# Patient Record
Sex: FEMALE | URBAN - METROPOLITAN AREA
[De-identification: names, ages, dates, MRNs, and addresses within clinical notes are randomized per-mention and may not be internally consistent; named-entity substitution may affect disease eponyms.]

---

## 2019-04-26 ENCOUNTER — APPOINTMENT (RX ONLY)
Dept: URBAN - METROPOLITAN AREA CLINIC 319 | Facility: CLINIC | Age: 79
Setting detail: DERMATOLOGY
End: 2019-04-26

## 2019-04-26 DIAGNOSIS — A63.0 ANOGENITAL (VENEREAL) WARTS: ICD-10-CM

## 2019-04-26 DIAGNOSIS — L91.0 HYPERTROPHIC SCAR: ICD-10-CM

## 2019-04-26 PROCEDURE — ? COUNSELING

## 2019-04-26 PROCEDURE — 99202 OFFICE O/P NEW SF 15 MIN: CPT

## 2019-04-26 PROCEDURE — ? ADDITIONAL NOTES

## 2019-04-26 ASSESSMENT — SCAR ASSESSEMENT OVERALL: SCAR ASSESSMENT: 2 (RAISED UNIFORM SCAR, NO ERYTHEMA)

## 2019-04-26 NOTE — PROCEDURE: ADDITIONAL NOTES
Additional Notes: Patient declines ILK of keloid.
Detail Level: Simple
Additional Notes: No lesions noted on labia.

## 2020-05-06 ENCOUNTER — OFFICE VISIT (OUTPATIENT)
Dept: INTERNAL MEDICINE CLINIC | Facility: CLINIC | Age: 80
End: 2020-05-06
Payer: COMMERCIAL

## 2020-05-06 ENCOUNTER — TELEPHONE (OUTPATIENT)
Dept: INTERNAL MEDICINE CLINIC | Facility: CLINIC | Age: 80
End: 2020-05-06

## 2020-05-06 VITALS
DIASTOLIC BLOOD PRESSURE: 70 MMHG | SYSTOLIC BLOOD PRESSURE: 110 MMHG | HEIGHT: 63 IN | BODY MASS INDEX: 24.27 KG/M2 | WEIGHT: 137 LBS | TEMPERATURE: 98 F | HEART RATE: 54 BPM | OXYGEN SATURATION: 98 %

## 2020-05-06 DIAGNOSIS — Z12.31 ENCOUNTER FOR SCREENING MAMMOGRAM FOR MALIGNANT NEOPLASM OF BREAST: Primary | ICD-10-CM

## 2020-05-06 DIAGNOSIS — R92.2 DENSE BREAST: ICD-10-CM

## 2020-05-06 DIAGNOSIS — I10 ESSENTIAL HYPERTENSION: ICD-10-CM

## 2020-05-06 DIAGNOSIS — R53.83 FATIGUE, UNSPECIFIED TYPE: ICD-10-CM

## 2020-05-06 DIAGNOSIS — I89.0 LYMPHEDEMA: ICD-10-CM

## 2020-05-06 DIAGNOSIS — Z78.0 POSTMENOPAUSAL: ICD-10-CM

## 2020-05-06 PROCEDURE — G0463 HOSPITAL OUTPT CLINIC VISIT: HCPCS | Performed by: INTERNAL MEDICINE

## 2020-05-06 PROCEDURE — 99214 OFFICE O/P EST MOD 30 MIN: CPT | Performed by: INTERNAL MEDICINE

## 2020-05-06 RX ORDER — MULTIVITAMIN WITH IRON
250 TABLET ORAL DAILY
COMMUNITY

## 2020-05-06 RX ORDER — AMLODIPINE BESYLATE 5 MG/1
5 TABLET ORAL DAILY
Qty: 90 TABLET | Refills: 3 | Status: SHIPPED | OUTPATIENT
Start: 2020-05-06 | End: 2021-02-16

## 2020-05-06 RX ORDER — MELATONIN
325 DAILY
Qty: 90 TABLET | Refills: 3 | Status: SHIPPED | OUTPATIENT
Start: 2020-05-06 | End: 2021-02-16

## 2020-05-06 RX ORDER — MELATONIN
325 DAILY
COMMUNITY
End: 2020-05-06

## 2020-05-06 RX ORDER — AMLODIPINE BESYLATE 5 MG/1
5 TABLET ORAL DAILY
COMMUNITY
Start: 2017-04-03 | End: 2020-05-06

## 2020-05-06 RX ORDER — AMPICILLIN TRIHYDRATE 250 MG
600 CAPSULE ORAL 2 TIMES DAILY
COMMUNITY
End: 2020-07-21

## 2020-05-06 RX ORDER — UBIDECARENONE 200 MG
200 CAPSULE ORAL 2 TIMES DAILY
COMMUNITY
End: 2020-07-21

## 2020-05-06 NOTE — PROGRESS NOTES
Lyudmila Juarez is a 67year old female. Patient presents with:  Establish Care      HPI:   Lyudmila Juarez is a 67year old female who presents for a complete physical exam.      PMH:  HTN for the past 5-6 years  Lymphedema.  She has had this issue for >20 ye skin lesions  EYES:denies blurred vision or double vision  HEENT: denies nasal congestion, sinus pain, ST, sore throat  LUNGS: denies shortness of breath with exertion, cough or wheezing  BREAST: denies masses or nipple discharge  CARDIOVASCULAR: denies ch work better as this would allow for more systemic decongestion. Improving the lymphedema will decrease risk of infection and open wounds. 5. Dyslipidemia  Repeat labs; continue red yeast rice, coq10    6.  Gnosticism  Does not want transfusions

## 2020-05-06 NOTE — TELEPHONE ENCOUNTER
Pt. Is calling to check status of her Rx she went to the pharmacy and it wasn't there ph.  # [phone]  Routed to Rx

## 2020-06-10 ENCOUNTER — OFFICE VISIT (OUTPATIENT)
Dept: INTERNAL MEDICINE CLINIC | Facility: CLINIC | Age: 80
End: 2020-06-10
Payer: COMMERCIAL

## 2020-06-10 VITALS
TEMPERATURE: 99 F | HEART RATE: 54 BPM | DIASTOLIC BLOOD PRESSURE: 70 MMHG | OXYGEN SATURATION: 98 % | BODY MASS INDEX: 24.69 KG/M2 | HEIGHT: 62.5 IN | WEIGHT: 137.63 LBS | SYSTOLIC BLOOD PRESSURE: 130 MMHG

## 2020-06-10 DIAGNOSIS — E78.5 DYSLIPIDEMIA: ICD-10-CM

## 2020-06-10 DIAGNOSIS — G31.84 MILD COGNITIVE IMPAIRMENT: ICD-10-CM

## 2020-06-10 DIAGNOSIS — Z00.00 ENCOUNTER FOR ANNUAL HEALTH EXAMINATION: Primary | ICD-10-CM

## 2020-06-10 DIAGNOSIS — I89.0 LYMPHEDEMA: ICD-10-CM

## 2020-06-10 DIAGNOSIS — I10 ESSENTIAL HYPERTENSION: ICD-10-CM

## 2020-06-10 PROCEDURE — 96160 PT-FOCUSED HLTH RISK ASSMT: CPT | Performed by: INTERNAL MEDICINE

## 2020-06-10 PROCEDURE — G0439 PPPS, SUBSEQ VISIT: HCPCS | Performed by: INTERNAL MEDICINE

## 2020-06-10 PROCEDURE — 99397 PER PM REEVAL EST PAT 65+ YR: CPT | Performed by: INTERNAL MEDICINE

## 2020-06-10 PROCEDURE — G0009 ADMIN PNEUMOCOCCAL VACCINE: HCPCS | Performed by: INTERNAL MEDICINE

## 2020-06-10 PROCEDURE — 90732 PPSV23 VACC 2 YRS+ SUBQ/IM: CPT | Performed by: INTERNAL MEDICINE

## 2020-06-10 NOTE — PATIENT INSTRUCTIONS
Nancy Velazquez's SCREENING SCHEDULE   Tests on this list are recommended by your physician but may not be covered, or covered at this frequency, by your insurer. Please check with your insurance carrier before scheduling to verify coverage.    PREVENTATIV Covered every 5 years No results found for this or any previous visit. No flowsheet data found. Fecal Occult Blood   Covered Annually No results found for: FOB, OCCULTSTOOL No flowsheet data found.      Barium Enema-   uncomfortable but covered  Covered Hepatitis B for Moderate/High Risk       No orders found for this or any previous visit.  Medium/high risk factors:   End-stage renal disease   Hemophiliacs who received Factor VIII or IX concentrates   Clients of institutions for the mentally retarded   Pe

## 2020-06-10 NOTE — PROGRESS NOTES
HPI:   Bethel Escalante is a 67year old female who presents for a MA (Medicare Advantage) [facility] (Once per calendar year). PMH:  HTN for the past 5-6 years  Lymphedema. She has had this issue for >20 years.  She does wear jobst compression stockings (PHQ-2/PHQ-9): Over the LAST 2 WEEKS   Little interest or pleasure in doing things (over the last two weeks)?: Not at all  Feeling down, depressed, or hopeless (over the last two weeks)?: Not at all  PHQ-2 SCORE: 0     Advanced Directive:   She does NOT ha denies any unusual skin lesions  EYES: denies blurred vision or double vision  HEENT: denies nasal congestion, sinus pain or ST  LUNGS: denies shortness of breath with exertion  CARDIOVASCULAR: denies chest pain on exertion  GI: denies abdominal pain, mack Back:   Symmetric, no curvature, ROM normal, no CVA tenderness   Lungs:   Clear to auscultation bilaterally, respirations unlabored   Heart:  Regular rate and rhythm, S1 and S2 normal, no murmur, rub, or gallop   Abdomen:   Soft, non-tender, bowel sounds the lymphedema will decrease risk of infection and open wounds. 5. Dyslipidemia  Repeat labs; continue red yeast rice, coq10    6. Zoroastrianism  Does not want transfusions    7.  Mild cognitive impairment  Encouraged puzzles, regular exercise 21-65 or Pap+HPV every 5 yrs age 33-67, age 72 and older at high risk There are no preventive care reminders to display for this patient.  Update Health Maintenance if applicable    Chlamydia  Annually if high risk No results found for: CHLAMYDIA No flowshe

## 2020-06-25 ENCOUNTER — LAB ENCOUNTER (OUTPATIENT)
Dept: LAB | Facility: HOSPITAL | Age: 80
End: 2020-06-25
Attending: INTERNAL MEDICINE
Payer: MEDICARE

## 2020-06-25 DIAGNOSIS — D64.9 ANEMIA, UNSPECIFIED TYPE: ICD-10-CM

## 2020-06-25 DIAGNOSIS — R53.83 FATIGUE, UNSPECIFIED TYPE: ICD-10-CM

## 2020-06-25 DIAGNOSIS — I89.0 LYMPHEDEMA: ICD-10-CM

## 2020-06-25 DIAGNOSIS — I10 ESSENTIAL HYPERTENSION: ICD-10-CM

## 2020-06-25 PROCEDURE — 83540 ASSAY OF IRON: CPT

## 2020-06-25 PROCEDURE — 84466 ASSAY OF TRANSFERRIN: CPT

## 2020-06-25 PROCEDURE — 36415 COLL VENOUS BLD VENIPUNCTURE: CPT

## 2020-06-25 PROCEDURE — 85025 COMPLETE CBC W/AUTO DIFF WBC: CPT

## 2020-06-25 PROCEDURE — 80061 LIPID PANEL: CPT

## 2020-06-25 PROCEDURE — 80053 COMPREHEN METABOLIC PANEL: CPT

## 2020-06-25 PROCEDURE — 84443 ASSAY THYROID STIM HORMONE: CPT

## 2020-06-25 PROCEDURE — 82728 ASSAY OF FERRITIN: CPT

## 2020-06-26 ENCOUNTER — TELEPHONE (OUTPATIENT)
Dept: INTERNAL MEDICINE CLINIC | Facility: CLINIC | Age: 80
End: 2020-06-26

## 2020-06-26 ENCOUNTER — HOSPITAL ENCOUNTER (OUTPATIENT)
Dept: BONE DENSITY | Age: 80
Discharge: HOME | End: 2020-06-26
Attending: INTERNAL MEDICINE
Payer: MEDICARE

## 2020-06-26 ENCOUNTER — HOSPITAL ENCOUNTER (OUTPATIENT)
Dept: MAMMOGRAPHY | Age: 80
Discharge: HOME | End: 2020-06-26
Attending: INTERNAL MEDICINE
Payer: MEDICARE

## 2020-06-26 DIAGNOSIS — Z78.0 POSTMENOPAUSAL: ICD-10-CM

## 2020-06-26 DIAGNOSIS — Z12.31 ENCOUNTER FOR SCREENING MAMMOGRAM FOR MALIGNANT NEOPLASM OF BREAST: ICD-10-CM

## 2020-06-26 DIAGNOSIS — D64.9 ANEMIA, UNSPECIFIED TYPE: Primary | ICD-10-CM

## 2020-06-26 DIAGNOSIS — R92.2 DENSE BREAST: ICD-10-CM

## 2020-06-26 PROCEDURE — 77080 DXA BONE DENSITY AXIAL: CPT | Performed by: INTERNAL MEDICINE

## 2020-06-26 PROCEDURE — 77063 BREAST TOMOSYNTHESIS BI: CPT | Performed by: INTERNAL MEDICINE

## 2020-06-26 PROCEDURE — 77067 SCR MAMMO BI INCL CAD: CPT | Performed by: INTERNAL MEDICINE

## 2020-06-26 NOTE — TELEPHONE ENCOUNTER
Spoke with Naomie Bonner in lab. She was able to view add-on labs ordered by Dr. Jered Luevano and states they will be able to add them on to yesterday's sample.

## 2020-07-01 ENCOUNTER — TELEPHONE (OUTPATIENT)
Dept: INTERNAL MEDICINE CLINIC | Facility: CLINIC | Age: 80
End: 2020-07-01

## 2020-07-01 DIAGNOSIS — E78.5 DYSLIPIDEMIA: Primary | ICD-10-CM

## 2020-07-01 DIAGNOSIS — D64.9 ANEMIA, UNSPECIFIED TYPE: ICD-10-CM

## 2020-07-01 RX ORDER — ATORVASTATIN CALCIUM 20 MG/1
20 TABLET, FILM COATED ORAL NIGHTLY
Qty: 90 TABLET | Refills: 1 | Status: SHIPPED | OUTPATIENT
Start: 2020-07-01 | End: 2020-09-18

## 2020-07-01 RX ORDER — BIOTIN 1 MG
2 TABLET ORAL DAILY
Qty: 1 CAPSULE | Refills: 0 | COMMUNITY
Start: 2020-07-01 | End: 2021-06-10

## 2020-07-01 RX ORDER — IBUPROFEN 200 MG
CAPSULE ORAL
Qty: 1 TABLET | Refills: 0 | Status: SHIPPED | OUTPATIENT
Start: 2020-07-01

## 2020-07-01 NOTE — TELEPHONE ENCOUNTER
Please notify patient that I have reviewed her results:    1) mammogram was normal  2) bone density shows slight thinning of her bones. I would make sure she is taking calcium supplement 600mg daily, and vitamin D3 2000 IU daily.  I would also encourage Ivet Jeter

## 2020-07-21 ENCOUNTER — OFFICE VISIT (OUTPATIENT)
Dept: INTERNAL MEDICINE CLINIC | Facility: CLINIC | Age: 80
End: 2020-07-21
Payer: COMMERCIAL

## 2020-07-21 ENCOUNTER — TELEPHONE (OUTPATIENT)
Dept: INTERNAL MEDICINE CLINIC | Facility: CLINIC | Age: 80
End: 2020-07-21

## 2020-07-21 ENCOUNTER — HOSPITAL ENCOUNTER (OUTPATIENT)
Dept: ULTRASOUND IMAGING | Facility: HOSPITAL | Age: 80
Discharge: HOME | End: 2020-07-21
Attending: INTERNAL MEDICINE
Payer: MEDICARE

## 2020-07-21 VITALS
DIASTOLIC BLOOD PRESSURE: 70 MMHG | TEMPERATURE: 98 F | HEIGHT: 62.5 IN | WEIGHT: 132 LBS | HEART RATE: 53 BPM | SYSTOLIC BLOOD PRESSURE: 102 MMHG | OXYGEN SATURATION: 96 % | BODY MASS INDEX: 23.68 KG/M2

## 2020-07-21 DIAGNOSIS — R60.0 LEG EDEMA, RIGHT: ICD-10-CM

## 2020-07-21 DIAGNOSIS — R60.0 LEG EDEMA, RIGHT: Primary | ICD-10-CM

## 2020-07-21 PROCEDURE — 3008F BODY MASS INDEX DOCD: CPT | Performed by: INTERNAL MEDICINE

## 2020-07-21 PROCEDURE — 3074F SYST BP LT 130 MM HG: CPT | Performed by: INTERNAL MEDICINE

## 2020-07-21 PROCEDURE — G0463 HOSPITAL OUTPT CLINIC VISIT: HCPCS | Performed by: INTERNAL MEDICINE

## 2020-07-21 PROCEDURE — 99213 OFFICE O/P EST LOW 20 MIN: CPT | Performed by: INTERNAL MEDICINE

## 2020-07-21 PROCEDURE — 3078F DIAST BP <80 MM HG: CPT | Performed by: INTERNAL MEDICINE

## 2020-07-21 PROCEDURE — 93971 EXTREMITY STUDY: CPT | Performed by: INTERNAL MEDICINE

## 2020-07-21 NOTE — TELEPHONE ENCOUNTER
Spoke with patient and relayed Dr. Pilar Villanueva message. Reviewed instructions with patient until she was able to write them down and repeat them back. All questions answered. Patient verbalized understanding of information/instructions and agreement with plan.

## 2020-07-21 NOTE — TELEPHONE ENCOUNTER
Please notify patient that her US was negative; I would like her to do what I suggested with hydration and electrolytes to see if this helps her symptoms. Also would recommend breaking up her time with the lymphedema pump to see if her symptoms improve.  Wo

## 2020-07-21 NOTE — PROGRESS NOTES
Reymundo Nesbitt is a 67year old female. Patient presents with:  Checkup: Pain R leg x 2 weeks when using flexitouch.        HPI:   Reymundo Nesbitt is a 67year old female who presents for: pain in R leg x 2 weeks    Reports a pain in behind her knee for t Drug use: Never         REVIEW OF SYSTEMS:   GENERAL: feels well otherwise  SKIN: denies any unusual skin lesions  MSK: reports RLE pain. Denies weakness, trauma or injury.      EXAM:   /70   Pulse 53   Temp 98.4 °F (36.9 °C) (Temporal)   Ht 5' 2.5\"

## 2020-07-21 NOTE — TELEPHONE ENCOUNTER
To Dr. Stefania Sal---    Anahi from [address] calling with stat hold and call results of MUNA LYON negative for DVT.

## 2020-07-22 ENCOUNTER — TELEPHONE (OUTPATIENT)
Dept: INTERNAL MEDICINE CLINIC | Facility: CLINIC | Age: 80
End: 2020-07-22

## 2020-07-22 RX ORDER — GABAPENTIN 300 MG/1
300 CAPSULE ORAL DAILY
Qty: 90 CAPSULE | Refills: 0 | Status: SHIPPED | OUTPATIENT
Start: 2020-07-22 | End: 2020-07-27

## 2020-07-22 NOTE — TELEPHONE ENCOUNTER
Please call pt, she was seen on Tuesday by Dr Mary Augustine  Pt did Flexitouch Plus 20 minutes twice per day for lymphodema  Pt still having pain in leg  Should pt call [facility] to see if pressure setting should be changed?   Pt takes awhile to get to the phone, please be patient   Tasked to nursing

## 2020-07-22 NOTE — TELEPHONE ENCOUNTER
I had also asked her to hydrate--try gatorade 1 glass per day to see if this helps. If she tried this already, then we can try a medication to help with this called gabapentin. Would recommend that she take this once daily to see if it helps with her pain  Nursing ok to send in rx for gabapentin 300mg daily, #90, no refills.

## 2020-07-22 NOTE — TELEPHONE ENCOUNTER
eRXs were sent for both medications on 5/6/20 for year supply  Spoke to pharmacy, they have refills ready for patient. I called patient and notified her pharmacy has refills already for her.

## 2020-07-22 NOTE — TELEPHONE ENCOUNTER
Pt encouraged to increase fluids  - try 1 galss of gatorade / day  States she is drinking a sports drink during the day  But will try to get gatorade     Also DR JOSHUA garner Gabapentin 300 mg daily #90  No refills to see if that helps with her pain   rx sent to [facility] in [place]

## 2020-07-22 NOTE — TELEPHONE ENCOUNTER
To Dr. Liliane Aguirre---    Please advise. RN [facility] did talk to patient yesterday regarding your instructions for break up time on pump.

## 2020-07-22 NOTE — TELEPHONE ENCOUNTER
Pt requesting refills for:  Ferrous Sulfate 325MG  Amlodipine Besylate  [facility]  Tasked to Fortino Barry

## 2020-07-24 ENCOUNTER — TELEPHONE (OUTPATIENT)
Dept: INTERNAL MEDICINE CLINIC | Facility: CLINIC | Age: 80
End: 2020-07-24

## 2020-07-24 NOTE — TELEPHONE ENCOUNTER
These are very rare side effects---usually occur at much higher doses.  I think she should give this a try and let me know how it works

## 2020-07-24 NOTE — TELEPHONE ENCOUNTER
Pt said another possible effect is:  Leg swelling  This is a concern as pt has lymphedema    Please review with pt why she is taking medication

## 2020-07-24 NOTE — TELEPHONE ENCOUNTER
Patient called with concerns about side effects of new medication she is taking  - Gabapentin    States she has memory loss  &  memory problems are listed as one of the possible side effects    Requests call back from Dr Angelina Smith  to discuss/advise [phone]

## 2020-07-24 NOTE — TELEPHONE ENCOUNTER
Spoke to pt at length - reassured her;  Discussed ADRs and actions;   Pt is willing to trial and will call with any issues

## 2020-07-24 NOTE — TELEPHONE ENCOUNTER
Please call pt  What time of day and with what other medications should pt take the new medication/Gabapentin   Tasked to RX

## 2020-07-27 RX ORDER — NAPROXEN SODIUM 220 MG
1 TABLET ORAL NIGHTLY PRN
COMMUNITY

## 2020-07-27 NOTE — TELEPHONE ENCOUNTER
Spoke to patient and relayed MD message, patient verbalized understanding. Med list updated. To Dr. Ophelia Ritter-- patient would like assistance getting compression stockings. She thinks her son would be able to take her to [facility] in [place] to pick them up.

## 2020-07-27 NOTE — TELEPHONE ENCOUNTER
Rx faxed to [facility], confirmation received. Patient notified this was faxed, encouraged patient to call back with any issues.

## 2020-07-27 NOTE — TELEPHONE ENCOUNTER
Pt. called stating that she was taking gabapentin and it made her dizzy she almost fell out of the bed she discontinued it she takes ibuprofen or advil please advise ph.  # [phone]   Routed to clinical

## 2020-07-27 NOTE — TELEPHONE ENCOUNTER
Spoke to patient who reports she took one dose of gabapentin on Friday and it made her so dizzy she almost fell out of bed. She did not actually fall, she was able to catch herself.  She also read that this medication affects memory and she \"can't afford t

## 2020-07-27 NOTE — TELEPHONE ENCOUNTER
Please notify patient that it is ok to stop gabapentin and take aleve as needed. Make sure this is taken with food.  Please ask her to call us if she needs help getting her compression stockings

## 2020-07-28 ENCOUNTER — TELEPHONE (OUTPATIENT)
Dept: INTERNAL MEDICINE CLINIC | Facility: CLINIC | Age: 80
End: 2020-07-28

## 2020-07-28 NOTE — TELEPHONE ENCOUNTER
To Dr. VELARDE to please advise----  Spoke to patient who reports noticing green streaks on arms and hands bilaterally two days ago- she denied seeing streaks on her legs. Pt states she does not have any green streaks on her arms, hands or legs today.  Pt stat

## 2020-07-28 NOTE — TELEPHONE ENCOUNTER
Spoke to Dr. Cayla Galaviz who stated patient would not have to come in to be evaluated if not symptoms are present today. Spoke to patient who verbalized understanding to call our office if any symptoms re-appear. Pt agrees with plan to cancel today's appt.

## 2020-07-28 NOTE — TELEPHONE ENCOUNTER
For the last two days pt has noticed green streaks on her hands/arms & legs    She is Very Concerned about this because of her lymphedema - requests call back from nursing asap    [phone]

## 2020-08-03 ENCOUNTER — TELEPHONE (OUTPATIENT)
Dept: INTERNAL MEDICINE CLINIC | Facility: CLINIC | Age: 80
End: 2020-08-03

## 2020-08-03 NOTE — TELEPHONE ENCOUNTER
Please call pt  Would like to speak to Dr Abigail Rosen or her nurse regarding stockings for lymphedema  Tasked to nursing

## 2020-08-03 NOTE — TELEPHONE ENCOUNTER
Patient called and states she was on a phone call already and will call back office to further discuss her previous call to office.

## 2020-08-06 NOTE — TELEPHONE ENCOUNTER
Spoke with Messi Conklin Re: her Elastic Bandages & Supports (JOBST 20-30MMHG COMPRESSION SM). She reports she has them and they don't work as well as her previous ones which are on backorder during the pandemic but do help a little.  Notified Messi Conklin of Dr. Tyrone Starr

## 2020-09-09 ENCOUNTER — OFFICE VISIT (OUTPATIENT)
Dept: INTERNAL MEDICINE CLINIC | Facility: CLINIC | Age: 80
End: 2020-09-09
Payer: COMMERCIAL

## 2020-09-09 VITALS
TEMPERATURE: 98 F | SYSTOLIC BLOOD PRESSURE: 124 MMHG | BODY MASS INDEX: 23 KG/M2 | OXYGEN SATURATION: 99 % | HEART RATE: 64 BPM | DIASTOLIC BLOOD PRESSURE: 68 MMHG | WEIGHT: 129 LBS

## 2020-09-09 DIAGNOSIS — I89.0 LYMPHEDEMA: Primary | ICD-10-CM

## 2020-09-09 PROCEDURE — 3078F DIAST BP <80 MM HG: CPT | Performed by: INTERNAL MEDICINE

## 2020-09-09 PROCEDURE — 3074F SYST BP LT 130 MM HG: CPT | Performed by: INTERNAL MEDICINE

## 2020-09-09 PROCEDURE — 99214 OFFICE O/P EST MOD 30 MIN: CPT | Performed by: INTERNAL MEDICINE

## 2020-09-09 PROCEDURE — G0463 HOSPITAL OUTPT CLINIC VISIT: HCPCS | Performed by: INTERNAL MEDICINE

## 2020-09-09 NOTE — PROGRESS NOTES
Chief Complaint:   Patient presents with:  Leg Pain: Patient reports she is still having a lot of pain in her R leg after using device for lymphedema, she thinks the machine stopped working. Pain is traveling farther up her leg, worse at nighttime.  She has reviewed. No pertinent surgical history.   Social History:  Social History    Tobacco Use      Smoking status: Never Smoker      Smokeless tobacco: Never Used    Alcohol use: Yes      Frequency: Monthly or less    Drug use: Never    Family History:  Family Palpitations  Respiratory: Cough, Sputum, Wheezing, Shortness of breath  Gastrointestinal: Nausea, Vomiting, Diarrhea, Constipation, Pain, Heartburn, Dysphagia, Bloody stools, Tarry stools  Genitourinary: Dysmenorrhea, Dysuria, Urinary Frequency, Hematuria most recent set of lab work from 625/2020 with notable creatinine 1.05 EGFR 50-59, total cholesterol 296, HDL 93     CBC and TSH otherwise unremarkable    Imaging:  RLE Duplex 7/21/2020  FINDINGS:        The femoral and popliteal veins appear normal patient's chronic LAD  PLAN  - Monitor for now  - If persistent will consider imaging for diffuse LAD    # CKD  - Noted Cr 1.05, eGFR 50-59  - No active complaints at this time  PLAN  - Monitor with repeat CMP at next visit  - Avoid nephrotoxins such as NS

## 2020-09-18 RX ORDER — ATORVASTATIN CALCIUM 20 MG/1
20 TABLET, FILM COATED ORAL NIGHTLY
Qty: 30 TABLET | Refills: 0 | Status: SHIPPED | OUTPATIENT
Start: 2020-09-18 | End: 2020-10-14

## 2020-09-18 NOTE — TELEPHONE ENCOUNTER
Refill request received for atorvastatin. Per Dr. Jocelyne Mullen note in 7/1/20 telephone encounter:  in terms of her cholesterol---it is rita high. I would recommend that she start on medication at this time for this.  I would recommend starting lipitor once da

## 2020-09-24 ENCOUNTER — PATIENT MESSAGE (OUTPATIENT)
Dept: INTERNAL MEDICINE CLINIC | Facility: CLINIC | Age: 80
End: 2020-09-24

## 2020-09-24 NOTE — TELEPHONE ENCOUNTER
From: Martín Bailon  To: Harinder Anne MD  Sent: 9/24/2020 1:16 PM CDT  Subject: Referral Request    After my last visit she was told an appointment would be set up for her with a specialist or at an lymphedema clinic.    We don't see any further informa

## 2020-09-28 ENCOUNTER — TELEPHONE (OUTPATIENT)
Dept: PHYSICAL THERAPY | Age: 80
End: 2020-09-28

## 2020-09-28 ENCOUNTER — TELEPHONE (OUTPATIENT)
Dept: PHYSICAL THERAPY | Facility: HOSPITAL | Age: 80
End: 2020-09-28

## 2020-10-15 ENCOUNTER — OFFICE VISIT (OUTPATIENT)
Dept: PHYSICAL THERAPY | Facility: HOSPITAL | Age: 80
End: 2020-10-15
Attending: FAMILY MEDICINE
Payer: MEDICARE

## 2020-10-15 DIAGNOSIS — I89.0 LYMPHEDEMA: ICD-10-CM

## 2020-10-15 PROCEDURE — 97163 PT EVAL HIGH COMPLEX 45 MIN: CPT | Performed by: PHYSICAL THERAPIST

## 2020-10-15 PROCEDURE — 97140 MANUAL THERAPY 1/> REGIONS: CPT | Performed by: PHYSICAL THERAPIST

## 2020-10-15 RX ORDER — AMLODIPINE BESYLATE 5 MG/1
5 TABLET ORAL DAILY
Qty: 90 TABLET | Refills: 3 | OUTPATIENT
Start: 2020-10-15

## 2020-10-15 RX ORDER — ATORVASTATIN CALCIUM 20 MG/1
20 TABLET, FILM COATED ORAL NIGHTLY
Qty: 30 TABLET | Refills: 0 | Status: SHIPPED | OUTPATIENT
Start: 2020-10-15 | End: 2021-02-16

## 2020-10-15 RX ORDER — MELATONIN
325 DAILY
Qty: 90 TABLET | Refills: 3 | OUTPATIENT
Start: 2020-10-15

## 2020-10-15 NOTE — TELEPHONE ENCOUNTER
Current refill request refused due to refill is either a duplicate request or has active refills at the pharmacy. Check previous templates.     Requested Prescriptions     Pending Prescriptions Disp Refills   • atorvastatin 20 MG Oral Tab 30 tablet 0     S

## 2020-10-15 NOTE — PROGRESS NOTES
LE LYMPHEDEMA EVALUATION:     Referring Physician: Dr. Grey Shafer (I89.0)    Date of Onset: July 2020 Evaluation Date: 10/15/2020     PATIENT SUMMARY   Jin Jaquez is a 67year old female who presents to therapy today w/ c/o bladein Education or treatment limitation: pt and family report her memory is starting to fail  Rehab  Ovi Gonzales presents to Physical Therapy evaluation with swelling BLE and RLE pain.    BLE swelling appears consistent w/ primary lymp tourniquette affect    Stemmer's Sign: positive B feet    Leg Volume Measurements:  Lymphedema Calculations 10/15/2020 10/15/2020   DATE MEASURED 10/15/2020 10/15/2020   LOCATION/MEASUREMENTS LLE RLE   PATIENT POSITION  supine supine   LIMB POSITION extend Son present and dtr on skype    Management/Education: Explained Lymphedema diagnosis; informed patient of lymphedema precautions and risk reduction practices, and importance of skin care.  Discussed and demonstrated bandaging and compression options inclu Management and Training      Patient/Family/Caregiver was advised of these findings, precautions, and treatment options and has agreed to actively participate in planning and for this course of care.     Thank you for your referral. Please co-sign or sign a

## 2020-10-19 ENCOUNTER — APPOINTMENT (OUTPATIENT)
Dept: PHYSICAL THERAPY | Facility: HOSPITAL | Age: 80
End: 2020-10-19
Attending: FAMILY MEDICINE
Payer: MEDICARE

## 2020-10-21 ENCOUNTER — APPOINTMENT (OUTPATIENT)
Dept: PHYSICAL THERAPY | Facility: HOSPITAL | Age: 80
End: 2020-10-21
Attending: FAMILY MEDICINE
Payer: MEDICARE

## 2020-10-26 ENCOUNTER — APPOINTMENT (OUTPATIENT)
Dept: PHYSICAL THERAPY | Facility: HOSPITAL | Age: 80
End: 2020-10-26
Attending: FAMILY MEDICINE
Payer: MEDICARE

## 2020-10-28 ENCOUNTER — APPOINTMENT (OUTPATIENT)
Dept: PHYSICAL THERAPY | Facility: HOSPITAL | Age: 80
End: 2020-10-28
Attending: FAMILY MEDICINE
Payer: MEDICARE

## 2020-10-30 ENCOUNTER — APPOINTMENT (OUTPATIENT)
Dept: PHYSICAL THERAPY | Facility: HOSPITAL | Age: 80
End: 2020-10-30
Attending: FAMILY MEDICINE
Payer: MEDICARE

## 2020-11-02 ENCOUNTER — OFFICE VISIT (OUTPATIENT)
Dept: PHYSICAL THERAPY | Facility: HOSPITAL | Age: 80
End: 2020-11-02
Attending: FAMILY MEDICINE
Payer: MEDICARE

## 2020-11-02 PROCEDURE — 97140 MANUAL THERAPY 1/> REGIONS: CPT

## 2020-11-02 NOTE — PATIENT INSTRUCTIONS
[facility]CI[facility] HOME INSTRUCTIONS:    -WE VIDEO TAPED HOW TO PUT ON YOUR COMPRESSION in 3 videos on your phone. Please watch the video to follow the instructions for putting on and taking off the compression.   *also, I highlighted the material with pictures of

## 2020-11-02 NOTE — PROGRESS NOTES
Referring Physician: Dr. Schafer Ba ([id number])     Date of Onset: July 2020 Evaluation Date: 10/15/2020          Physical Therapy Lymphedema Daily Note    Precautions:  HTN,   Education or treatment limitation: pt and family report her mem DATE MEASURED 10/15/2020 10/15/2020   LOCATION/MEASUREMENTS LLE RLE   PATIENT POSITION  supine supine   LIMB POSITION extended extended   STARTING POINT 6 cm from heel (mid malleolus) 6 cm from heel (mid malleolus)   RIGHT FOOT 1ST LOCATION/CIRCUMFERENCE Removed and donned compression again while 2nd clinician video taped with pt's phone (3 videos, donning liner sock and calf, donning foot piece, removing wraps)  Pt was given instructions to wear the compression up to 23 hours a day, remove to bather, appl Decrease BLE volume by a combined total of 15% to be fitted for compression garments  3) Patient to be independent with donning/doffing compression garments, HEP, MLD, and lymphedema risk reduction practices to be able to self manage symptoms  4) Increase

## 2020-11-04 ENCOUNTER — OFFICE VISIT (OUTPATIENT)
Dept: PHYSICAL THERAPY | Facility: HOSPITAL | Age: 80
End: 2020-11-04
Attending: FAMILY MEDICINE
Payer: MEDICARE

## 2020-11-04 PROCEDURE — 97140 MANUAL THERAPY 1/> REGIONS: CPT | Performed by: PHYSICAL THERAPIST

## 2020-11-04 NOTE — PROGRESS NOTES
Referring Physician: Dr. Shantel Servin (I89.0)     Date of Onset: July 2020 Evaluation Date: 10/15/2020          Physical Therapy Lymphedema Daily Note    Precautions:  HTN,   Education or treatment limitation: pt and family report her mem feet     Leg Volume Measurements:  Lymphedema Calculations 10/15/2020 10/15/2020   DATE MEASURED 10/15/2020 10/15/2020   LOCATION/MEASUREMENTS LLE RLE   PATIENT POSITION  supine supine   LIMB POSITION extended extended   STARTING POINT 6 cm from heel (mid wrap with several pairs of liner socks)  Donned compression on RLE with verbal explanation.   Removed and donned compression again while 2nd clinician video taped with pt's phone (3 videos, donning liner sock and calf, donning foot piece, removing wraps)  P Discussed and demonstrated bandaging and compression options including various vendors that can be utilized              Assessment: Good swelling reduction of LE noted, anticipate once better able to rosaline foot wrap the feet swelling will improve.  Pt is li

## 2020-11-04 NOTE — PATIENT INSTRUCTIONS
ON/OFF button             Scroll button to pick which program you want  1) Waist and legs shaded means it is the full program. Only do this if your family is there to help you get the shorts and both legs on.  Need to use the 7 plug adap

## 2020-11-06 ENCOUNTER — OFFICE VISIT (OUTPATIENT)
Dept: PHYSICAL THERAPY | Facility: HOSPITAL | Age: 80
End: 2020-11-06
Attending: FAMILY MEDICINE
Payer: MEDICARE

## 2020-11-06 PROCEDURE — 97140 MANUAL THERAPY 1/> REGIONS: CPT | Performed by: PHYSICAL THERAPIST

## 2020-11-06 PROCEDURE — 97110 THERAPEUTIC EXERCISES: CPT | Performed by: PHYSICAL THERAPIST

## 2020-11-06 NOTE — PROGRESS NOTES
Referring Physician: Dr. Kerrin Holter (I89.0)     Date of Onset: July 2020 Evaluation Date: 10/15/2020          Physical Therapy Lymphedema Daily Note    Precautions:  HTN,   Education or treatment limitation: pt and family report her mem legs soft, slight pitting  - feet and ankles significant pitting  - pt currently wearing OTS compression knee highs, they are old and stretched out, binding at the ankle causing tourniquette affect     Stemmer's Sign: positive B feet     Leg Volume Measure Provided MLD for BLE's. Neck sequence, abdominal sequence, deep breathing, B inguinal, B LE's (extra time spent on LLE)  Lotion used during LE MLD.     Compression:  Pt had all her new compression velcro wraps (B dano classic leg wrap and foot wrap with wraps    Contacted [facility]- will arrange for add'l home training for home pump use (schedule towards end of month)    Contacted Dtr Tessa to discuss compression garments. Reviewed differences between OTS and custom.  Recommended custom flat knit laying supine.      Plan:   Frequency / Duration: Patient will be seen for 2-3 x/week or a total of 20 visits over a 90 day period, decreasing frequency as symptoms improve.      Treatment will include: Complete Decongestive Therapy: Manual Therapy, Self-Ca

## 2020-11-09 ENCOUNTER — OFFICE VISIT (OUTPATIENT)
Dept: PHYSICAL THERAPY | Facility: HOSPITAL | Age: 80
End: 2020-11-09
Attending: FAMILY MEDICINE
Payer: MEDICARE

## 2020-11-09 PROCEDURE — 97140 MANUAL THERAPY 1/> REGIONS: CPT

## 2020-11-09 NOTE — PROGRESS NOTES
Referring Physician: Dr. Elpidio Hernandez (I89.0)     Date of Onset: July 2020 Evaluation Date: 10/15/2020          Physical Therapy Lymphedema Daily Note    Precautions:  HTN,   Education or treatment limitation: pt and family report her mem - poor abd strength               - pain appears to be radicular pain, will need to continue to monitor/assess     Posture: Rounded shld     Gait: w/o asst device, slightly flexed forward      Bed mobility/transfers: indepdendent     Edema/Tissue Obse garments  - measured/fitted for compression velcro wrap and provided pt information on where to purchase ([employer]). Recommending Owen Folds classic foot and lower leg wraps, size small for all.  Once reduced recommend custom flat knit compression ga pt instr)    Manual therapy (80 minutes)    MLD: Provided MLD for BLE's. Neck sequence, abdominal sequence, deep breathing, B inguinal, B LE's. Lotion used during LE MLD.     Compression:  - reviewed proper donning of wraps  - add'l markings made on the w just leave the garment plugged into the unit and use the same garment on each leg this way she does not have to un-plug the hose for the garment. Reviewed that the garment is pre-fitted and the velcro can stay in place.   The garment will slide on/off the walking and less pain  5) Increase abd strength to fair to improve lumbar stabilization to allow less pain when laying supine.      Plan:   Continue with use of home compression pump doing single leg program.  Frequency / Duration: Patient will be seen for

## 2020-11-10 ENCOUNTER — TELEPHONE (OUTPATIENT)
Dept: PHYSICAL THERAPY | Facility: HOSPITAL | Age: 80
End: 2020-11-10

## 2020-11-10 NOTE — TELEPHONE ENCOUNTER
Returned call to pt's daughter's Alyssia. Alyssia left me a voice message stating the Nancy's family is concerned about her coming to the clinic with the rise in Covid-19 cases. Given her mother's age and co-morbidities.   Alyssia stated that the fam

## 2020-11-11 ENCOUNTER — APPOINTMENT (OUTPATIENT)
Dept: PHYSICAL THERAPY | Facility: HOSPITAL | Age: 80
End: 2020-11-11
Attending: FAMILY MEDICINE
Payer: MEDICARE

## 2020-11-13 ENCOUNTER — APPOINTMENT (OUTPATIENT)
Dept: PHYSICAL THERAPY | Facility: HOSPITAL | Age: 80
End: 2020-11-13
Attending: FAMILY MEDICINE
Payer: MEDICARE

## 2020-11-16 ENCOUNTER — APPOINTMENT (OUTPATIENT)
Dept: PHYSICAL THERAPY | Facility: HOSPITAL | Age: 80
End: 2020-11-16
Attending: FAMILY MEDICINE
Payer: MEDICARE

## 2020-11-18 ENCOUNTER — APPOINTMENT (OUTPATIENT)
Dept: PHYSICAL THERAPY | Facility: HOSPITAL | Age: 80
End: 2020-11-18
Attending: FAMILY MEDICINE
Payer: MEDICARE

## 2020-11-20 ENCOUNTER — APPOINTMENT (OUTPATIENT)
Dept: PHYSICAL THERAPY | Facility: HOSPITAL | Age: 80
End: 2020-11-20
Attending: FAMILY MEDICINE
Payer: MEDICARE

## 2020-11-23 ENCOUNTER — APPOINTMENT (OUTPATIENT)
Dept: PHYSICAL THERAPY | Facility: HOSPITAL | Age: 80
End: 2020-11-23
Attending: FAMILY MEDICINE
Payer: MEDICARE

## 2020-11-23 ENCOUNTER — TELEPHONE (OUTPATIENT)
Dept: PHYSICAL THERAPY | Facility: HOSPITAL | Age: 80
End: 2020-11-23

## 2020-11-23 NOTE — TELEPHONE ENCOUNTER
Called and left message for pt's son, Acosta Blackman, re: remaining PT appts. Pt had decided earlier that they had concerns about Ketan Shell coming to PT d/t the uptick in Covid cases.   We were going to follow up to make sure family and pt are maintaining swelling r

## 2020-11-24 ENCOUNTER — TELEPHONE (OUTPATIENT)
Dept: PHYSICAL THERAPY | Facility: HOSPITAL | Age: 80
End: 2020-11-24

## 2020-11-24 NOTE — TELEPHONE ENCOUNTER
Left VM for Alyssia to contact PT department re: follow up appts for her mom Liliam Sykes). Family had decided to cancel several appts d/t the rise in Covid cases. Addt'l appts left on the schedule for this week 11/27 in case follow up was needed.

## 2020-11-25 ENCOUNTER — APPOINTMENT (OUTPATIENT)
Dept: PHYSICAL THERAPY | Facility: HOSPITAL | Age: 80
End: 2020-11-25
Attending: FAMILY MEDICINE
Payer: MEDICARE

## 2020-11-27 ENCOUNTER — APPOINTMENT (OUTPATIENT)
Dept: PHYSICAL THERAPY | Facility: HOSPITAL | Age: 80
End: 2020-11-27
Attending: FAMILY MEDICINE
Payer: MEDICARE

## 2020-11-30 ENCOUNTER — APPOINTMENT (OUTPATIENT)
Dept: PHYSICAL THERAPY | Facility: HOSPITAL | Age: 80
End: 2020-11-30
Attending: FAMILY MEDICINE
Payer: MEDICARE

## 2020-12-02 ENCOUNTER — APPOINTMENT (OUTPATIENT)
Dept: PHYSICAL THERAPY | Facility: HOSPITAL | Age: 80
End: 2020-12-02
Attending: FAMILY MEDICINE
Payer: MEDICARE

## 2020-12-04 ENCOUNTER — APPOINTMENT (OUTPATIENT)
Dept: PHYSICAL THERAPY | Facility: HOSPITAL | Age: 80
End: 2020-12-04
Attending: FAMILY MEDICINE
Payer: MEDICARE

## 2020-12-17 ENCOUNTER — TELEPHONE (OUTPATIENT)
Dept: INTERNAL MEDICINE CLINIC | Facility: CLINIC | Age: 80
End: 2020-12-17

## 2020-12-17 NOTE — TELEPHONE ENCOUNTER
----- Message from Cammie Mcgregor sent at 12/16/2020  4:59 PM CST -----  Regarding: Prescription Question  Contact: [phone]  Would like to discuss medication that helps to calm anxiety. Would like to talk with a doctor via telephone.   Jamison Adam

## 2020-12-22 ENCOUNTER — PATIENT MESSAGE (OUTPATIENT)
Dept: INTERNAL MEDICINE CLINIC | Facility: CLINIC | Age: 80
End: 2020-12-22

## 2020-12-23 ENCOUNTER — TELEPHONE (OUTPATIENT)
Dept: INTERNAL MEDICINE CLINIC | Facility: CLINIC | Age: 80
End: 2020-12-23

## 2020-12-23 NOTE — TELEPHONE ENCOUNTER
Spoke with Rhys Conrad - 2 identifiers used    Rhys Conrad concerned about worsening short term memory loss. Mother  from likely Alzheimer's. More and more anxious with insomnia and irritability. Rhys Conrad is concerned that she may be developing Alzheimer's as well. Brother and mother live by the hospital and able to come in as early as possible. Discussed with Rhys Conrad that she will need a full evaluation including blood work and imaging to rule out secondary causes of dementia. Based on my assessment, I may also start medications to help with her behavioral disturbanes. Please call patient and/or Patient's son Grant Buam to have her seen in my clinic. Please schedule her for a 1 hour appointment given multiple concerns.  Ideally, I would see her for continuity of care (last seen in 2020 by me for lymphedema)

## 2020-12-23 NOTE — TELEPHONE ENCOUNTER
Pt's son Parker German called. (he is not listed on hipaa)  He would like to discuss the need for a possible medication for his Mother.   Jonatan Turk can be reached at [phone].  (see previous [employer] messages)

## 2020-12-30 NOTE — PROGRESS NOTES
Physical Therapy Discharge Summary:  Janna Galo has been discharged from PT at this time. Anne Perez attended 5 PT sessions which included: family training on use of home compression pump, donning/doffing compression velcro wraps, MLD and HEP.    Due to t

## 2020-12-31 RX ORDER — MELATONIN
325 DAILY
Qty: 90 TABLET | Refills: 3 | OUTPATIENT
Start: 2020-12-31

## 2020-12-31 RX ORDER — AMLODIPINE BESYLATE 5 MG/1
5 TABLET ORAL DAILY
Qty: 90 TABLET | Refills: 3 | OUTPATIENT
Start: 2020-12-31

## 2020-12-31 NOTE — TELEPHONE ENCOUNTER
Current refill request refused due to refill is either a duplicate request or has active refills at the pharmacy. Check previous templates.     Requested Prescriptions     Refused Prescriptions Disp Refills   • ferrous sulfate 325 (65 FE) MG Oral Tab EC 90

## 2020-12-31 NOTE — TELEPHONE ENCOUNTER
Current refill request refused due to refill is either a duplicate request or has active refills at the pharmacy. Check previous templates.     Requested Prescriptions     Refused Prescriptions Disp Refills   • amLODIPine Besylate 5 MG Oral Tab 90 tablet 3

## 2021-02-15 PROBLEM — N18.30 CKD (CHRONIC KIDNEY DISEASE) STAGE 3, GFR 30-59 ML/MIN (HCC): Chronic | Status: ACTIVE | Noted: 2021-02-15

## 2021-02-15 NOTE — PROGRESS NOTES
Chief Complaint:   Patient presents with:  Checkup: Pt here f/u appt     HPI:     Ms. Ely Bauman is a 79year old female past medical history of chronic lymphedema of bilateral lower extremities, chronic kidney disease, hypertension who presents today for denia Supports (JOBST 20-30MMHG COMPRESSION SM) Does not apply Misc Knee high compression stockings to be worn daily 6 each 0   • amLODIPine Besylate 5 MG Oral Tab Take 1 tablet (5 mg total) by mouth daily.  90 tablet 3   • ferrous sulfate 325 (65 FE) MG Oral Tab Polyuria, Polydipsia, Temperature Intolerance    EXAM:   Vital Signs:  Blood pressure 132/64, pulse 60, temperature 98 °F (36.7 °C), height 5' 2.5\" (1.588 m), weight 130 lb 6.4 oz (59.1 kg), SpO2 98 %. Constitutional: No acute distress.  Alert and orie       RECOMMENDATIONS:    ROUTINE MAMMOGRAM AND CLINICAL EVALUATION IN 12 MONTHS.        ASSESSMENT AND PLAN:     Ms. Kaelyn Raygoza is a 79year old female past medical history of chronic lymphedema of bilateral lower extremities, chronic kidney disease, hyperten this is related to patient's chronic LAD  PLAN  - Monitor for now  - If persistent will consider imaging for diffuse LAD     # CKD  - Noted Cr 1.05, eGFR 50-59  - No active complaints at this time  PLAN  - Monitor with repeat CMP at next visit  - Avoid nep

## 2021-02-15 NOTE — PATIENT INSTRUCTIONS
You were seen in clinic for memory issues. As part of her work-up, we have ordered blood work to determine if there is a secondary cause of your worsening memory issues.   As discussed, if something is deficient (such as vitamin D deficiency), we will try

## 2021-02-16 ENCOUNTER — LAB ENCOUNTER (OUTPATIENT)
Dept: LAB | Age: 81
End: 2021-02-16
Attending: INTERNAL MEDICINE
Payer: MEDICARE

## 2021-02-16 ENCOUNTER — OFFICE VISIT (OUTPATIENT)
Dept: INTERNAL MEDICINE CLINIC | Facility: CLINIC | Age: 81
End: 2021-02-16
Payer: COMMERCIAL

## 2021-02-16 ENCOUNTER — PATIENT MESSAGE (OUTPATIENT)
Dept: INTERNAL MEDICINE CLINIC | Facility: CLINIC | Age: 81
End: 2021-02-16

## 2021-02-16 VITALS
HEART RATE: 60 BPM | DIASTOLIC BLOOD PRESSURE: 64 MMHG | WEIGHT: 130.38 LBS | OXYGEN SATURATION: 98 % | HEIGHT: 62.5 IN | BODY MASS INDEX: 23.39 KG/M2 | SYSTOLIC BLOOD PRESSURE: 132 MMHG | TEMPERATURE: 98 F

## 2021-02-16 DIAGNOSIS — I10 ESSENTIAL HYPERTENSION: ICD-10-CM

## 2021-02-16 DIAGNOSIS — N18.31 STAGE 3A CHRONIC KIDNEY DISEASE (HCC): Chronic | ICD-10-CM

## 2021-02-16 DIAGNOSIS — E78.5 DYSLIPIDEMIA: ICD-10-CM

## 2021-02-16 DIAGNOSIS — E55.9 VITAMIN D DEFICIENCY: ICD-10-CM

## 2021-02-16 DIAGNOSIS — R41.89 COGNITIVE IMPAIRMENT: ICD-10-CM

## 2021-02-16 DIAGNOSIS — I89.0 LYMPHEDEMA: Primary | ICD-10-CM

## 2021-02-16 LAB
ALBUMIN SERPL-MCNC: 3.7 G/DL (ref 3.4–5)
ALBUMIN/GLOB SERPL: 1 {RATIO} (ref 1–2)
ALP LIVER SERPL-CCNC: 92 U/L
ALT SERPL-CCNC: 24 U/L
ANION GAP SERPL CALC-SCNC: 4 MMOL/L (ref 0–18)
AST SERPL-CCNC: 16 U/L (ref 15–37)
BASOPHILS # BLD AUTO: 0.03 X10(3) UL (ref 0–0.2)
BASOPHILS NFR BLD AUTO: 0.5 %
BILIRUB SERPL-MCNC: 0.4 MG/DL (ref 0.1–2)
BUN BLD-MCNC: 19 MG/DL (ref 7–18)
BUN/CREAT SERPL: 16.7 (ref 10–20)
CALCIUM BLD-MCNC: 9.6 MG/DL (ref 8.5–10.1)
CHLORIDE SERPL-SCNC: 108 MMOL/L (ref 98–112)
CO2 SERPL-SCNC: 29 MMOL/L (ref 21–32)
CREAT BLD-MCNC: 1.14 MG/DL
DEPRECATED RDW RBC AUTO: 42.5 FL (ref 35.1–46.3)
EOSINOPHIL # BLD AUTO: 0.04 X10(3) UL (ref 0–0.7)
EOSINOPHIL NFR BLD AUTO: 0.7 %
ERYTHROCYTE [DISTWIDTH] IN BLOOD BY AUTOMATED COUNT: 15.9 % (ref 11–15)
GLOBULIN PLAS-MCNC: 3.6 G/DL (ref 2.8–4.4)
GLUCOSE BLD-MCNC: 92 MG/DL (ref 70–99)
HCT VFR BLD AUTO: 37.4 %
HGB BLD-MCNC: 11.6 G/DL
IMM GRANULOCYTES # BLD AUTO: 0.02 X10(3) UL (ref 0–1)
IMM GRANULOCYTES NFR BLD: 0.3 %
LYMPHOCYTES # BLD AUTO: 2.74 X10(3) UL (ref 1–4)
LYMPHOCYTES NFR BLD AUTO: 46.9 %
M PROTEIN MFR SERPL ELPH: 7.3 G/DL (ref 6.4–8.2)
MCH RBC QN AUTO: 23.2 PG (ref 26–34)
MCHC RBC AUTO-ENTMCNC: 31 G/DL (ref 31–37)
MCV RBC AUTO: 74.9 FL
MONOCYTES # BLD AUTO: 0.55 X10(3) UL (ref 0.1–1)
MONOCYTES NFR BLD AUTO: 9.4 %
NEUTROPHILS # BLD AUTO: 2.46 X10 (3) UL (ref 1.5–7.7)
NEUTROPHILS # BLD AUTO: 2.46 X10(3) UL (ref 1.5–7.7)
NEUTROPHILS NFR BLD AUTO: 42.2 %
NEUTS VAC BLD QL SMEAR: PRESENT
OSMOLALITY SERPL CALC.SUM OF ELEC: 294 MOSM/KG (ref 275–295)
PATIENT FASTING Y/N/NP: NO
PLATELET # BLD AUTO: 168 10(3)UL (ref 150–450)
PLATELET MORPHOLOGY: NORMAL
POTASSIUM SERPL-SCNC: 4.3 MMOL/L (ref 3.5–5.1)
RBC # BLD AUTO: 4.99 X10(6)UL
SODIUM SERPL-SCNC: 141 MMOL/L (ref 136–145)
T4 FREE SERPL-MCNC: 0.8 NG/DL (ref 0.8–1.7)
TSI SER-ACNC: 2.94 MIU/ML (ref 0.36–3.74)
VIT B12 SERPL-MCNC: 497 PG/ML (ref 193–986)
WBC # BLD AUTO: 5.8 X10(3) UL (ref 4–11)

## 2021-02-16 PROCEDURE — 3075F SYST BP GE 130 - 139MM HG: CPT | Performed by: INTERNAL MEDICINE

## 2021-02-16 PROCEDURE — 3008F BODY MASS INDEX DOCD: CPT | Performed by: INTERNAL MEDICINE

## 2021-02-16 PROCEDURE — 99214 OFFICE O/P EST MOD 30 MIN: CPT | Performed by: INTERNAL MEDICINE

## 2021-02-16 PROCEDURE — 82306 VITAMIN D 25 HYDROXY: CPT

## 2021-02-16 PROCEDURE — 84443 ASSAY THYROID STIM HORMONE: CPT

## 2021-02-16 PROCEDURE — 85025 COMPLETE CBC W/AUTO DIFF WBC: CPT

## 2021-02-16 PROCEDURE — 82607 VITAMIN B-12: CPT

## 2021-02-16 PROCEDURE — 36415 COLL VENOUS BLD VENIPUNCTURE: CPT

## 2021-02-16 PROCEDURE — 3078F DIAST BP <80 MM HG: CPT | Performed by: INTERNAL MEDICINE

## 2021-02-16 PROCEDURE — 80053 COMPREHEN METABOLIC PANEL: CPT

## 2021-02-16 PROCEDURE — 84439 ASSAY OF FREE THYROXINE: CPT

## 2021-02-16 RX ORDER — AMLODIPINE BESYLATE 5 MG/1
5 TABLET ORAL DAILY
Qty: 90 TABLET | Refills: 3 | Status: SHIPPED | OUTPATIENT
Start: 2021-02-16 | End: 2021-10-20

## 2021-02-16 RX ORDER — MELATONIN
325 DAILY
Qty: 90 TABLET | Refills: 3 | Status: SHIPPED | OUTPATIENT
Start: 2021-02-16

## 2021-02-16 RX ORDER — ESCITALOPRAM OXALATE 5 MG/1
5 TABLET ORAL DAILY
Qty: 90 TABLET | Refills: 0 | Status: SHIPPED | OUTPATIENT
Start: 2021-02-16

## 2021-02-16 NOTE — TELEPHONE ENCOUNTER
From: Glory Kaufman  To: Elgin Mcgee MD  Sent: 2/16/2021 3:50 PM CST  Subject: Visit Non Parlor Dr. Da Craven,    At your convenience, please give me a call to discuss my mother, Ketan Velazquez's visit today.     Thank you in advance for

## 2021-02-16 NOTE — TELEPHONE ENCOUNTER
Spoke with Janette Downs - OK per Patient Leah Willis from clinic visit today to update Janette Downs who is out of [place]    As discussed in clinic, gave Janette Downs on update.  He did speak with Zak after the clinic visit with concerns that his mother might have do

## 2021-02-17 LAB — 25(OH)D3 SERPL-MCNC: 34.9 NG/ML (ref 30–100)

## 2021-02-17 NOTE — TELEPHONE ENCOUNTER
Please notify the patient that her blood work from 2/16/2021 was unremarkable as her secondary work-up for her memory issues. Her kidney function is just about the same, she should continue with adequate hydration.   Vitamin D, thyroid levels and blood c

## 2021-02-17 NOTE — TELEPHONE ENCOUNTER
Spoke to patient and relayed Roro Tafoya recommendations, pt verbalized understanding and agreed to  advise and recommendations. No other questions or concerns.

## 2021-03-12 DIAGNOSIS — Z23 NEED FOR VACCINATION: ICD-10-CM

## 2021-03-17 RX ORDER — AMLODIPINE BESYLATE 5 MG/1
5 TABLET ORAL DAILY
Qty: 90 TABLET | Refills: 3 | OUTPATIENT
Start: 2021-03-17

## 2021-05-26 NOTE — TELEPHONE ENCOUNTER
Pt started on medication 2/16/21 per MD in TE:  \"I did talk with Rafal Friedman regarding these concerns.  She is open to starting a low-dose Escitalopram 5 mg daily and asked her to check in with us in 3 to 4 weeks to ensure that she is tolerating the medication

## 2021-06-09 ENCOUNTER — TELEPHONE (OUTPATIENT)
Dept: INTERNAL MEDICINE CLINIC | Facility: CLINIC | Age: 81
End: 2021-06-09

## 2021-06-09 NOTE — PROGRESS NOTES
Chief Complaint:   Patient presents with:  Physical: Medicare Annual Exam. Swollen lymph node RT groin. She noticed it weeks ago. Has gone down in size recently. Continues at home with dailt at home Lymphedema therapy.          HPI:     Ms. Karma Barker is a 79 y GLUCONATE OR Take 1 tablet by mouth daily. • Cholecalciferol (VITAMIN D) 50 MCG (2000 UT) Oral Tab Take 1 tablet by mouth daily.      • Elastic Bandages & Supports (JOBST 20-30MMHG COMPRESSION ) Does not apply Misc Knee high compression stockings to b Syncope, Dizziness, Headache, Falls  Psych:  Anxiety, Depression, Insomnia, Suicidal Ideation, Homicidal ideation, Memory Changes  Heme/Lymph: Bruising, Bleeding, Lymphadenopathy  Endocrine: Polyuria, Polydipsia, Temperature Intolerance    EXAM:   Vital Sig may be increased fracture risk.  Findings in the total left hip are in the normal range, and there is no increased fracture risk.  Ten year fracture risk for major osteoporotic fracture   is 6.9%, and for hip fracture is 2.0%.    2. Findings in the total AP at this time  PLAN  -Repeat BMP  - Avoid nephrotoxins such as NSAIDs as above     # HTN  - Remains at goal   - Continue with home amlodipine. Orders This Visit:  No orders of the defined types were placed in this encounter.       Meds This Visit:

## 2021-06-09 NOTE — H&P
HPI:   Gladys Bruno is a 79year old female who presents for a Medicare Subsequent Annual Wellness visit (Pt already had Initial Annual Wellness).     Ms. Peter Gomez X 50 year old female past medical history of chronic lymphedema of bilateral lower extre is it?: Correct  What year is it?: Correct  Recall \"Ball\": Correct  Recall \"Flag\": Correct  Recall \"Tree\":  Incorrect       Functional Ability/Status   Dee Dee Saxena has some abnormal functions as listed below:  She has Driving difficulties based o oz (59.1 kg)  09/09/20 : 129 lb (58.5 kg)     Last Cholesterol Labs:   Lab Results   Component Value Date    CHOLEST 296 (H) 06/25/2020    HDL 93 (H) 06/25/2020     (H) 06/25/2020    TRIG 85 06/25/2020          Last Chemistry Labs:   Lab Results   C does not use drugs.      REVIEW OF SYSTEMS:   GENERAL: feels well otherwise  SKIN: denies any unusual skin lesions  EYES: denies blurred vision or double vision  HEENT: denies nasal congestion, sinus pain or ST  LUNGS: denies shortness of breath with exerti no curvature, ROM normal, no CVA tenderness   Lungs:   Clear to auscultation bilaterally, respirations unlabored   Heart:  Regular rate and rhythm, S1 and S2 normal, no murmur, rub, or gallop   Abdomen:   Soft, non-tender, bowel sounds active all four quad osteopenia, and there may be increased fracture risk.  Findings in the total left hip are in the normal range, and there is no increased fracture risk.  Ten year fracture risk for major osteoporotic fracture   is 6.9%, and for hip fracture is 2.0%.    2. Fi adhering to recommended management  PLAN  - Continue with compression stockings. - Continue with current exercises at home  -Limit fluid intake if worsening swelling  - For pain control, advised continue avoidance of NSAIDs given CKD.  Can try Tylenol PRN (19-26): Not indicated  Pneumococcal up-to-date    Immunization History   Administered Date(s) Administered   • Covid-19 Vaccine Pfizer 30 mcg/0.3 ml 04/02/2021, 04/23/2021   • FLU VAC High Dose 65 YRS & Older PRSV Free (10997) 09/26/2019   • Fluvirin, 3 Y without apparent signs or symptoms of cardiovascular disease Lab Results   Component Value Date    CHOLEST 296 (H) 06/25/2020    HDL 93 (H) 06/25/2020     (H) 06/25/2020    TRIG 85 06/25/2020         Electrocardiogram (EKG)   Covered if needed at We certain risk factors   04/19/2010  No recommendations at this time    Tetanus Toxoid Not covered by Medicare Part B unless medically necessary (cut with metal); may be covered with your pharmacy prescription benefits -    Tetanus, Diptheria and Pertusis TD

## 2021-06-10 ENCOUNTER — OFFICE VISIT (OUTPATIENT)
Dept: INTERNAL MEDICINE CLINIC | Facility: CLINIC | Age: 81
End: 2021-06-10
Payer: COMMERCIAL

## 2021-06-10 VITALS
HEART RATE: 53 BPM | HEIGHT: 62.5 IN | TEMPERATURE: 98 F | WEIGHT: 123 LBS | BODY MASS INDEX: 22.07 KG/M2 | SYSTOLIC BLOOD PRESSURE: 128 MMHG | OXYGEN SATURATION: 100 % | DIASTOLIC BLOOD PRESSURE: 60 MMHG

## 2021-06-10 DIAGNOSIS — I89.0 LYMPHEDEMA: ICD-10-CM

## 2021-06-10 DIAGNOSIS — E78.5 DYSLIPIDEMIA: ICD-10-CM

## 2021-06-10 DIAGNOSIS — R59.0 PELVIC LYMPHADENOPATHY: ICD-10-CM

## 2021-06-10 DIAGNOSIS — R41.89 COGNITIVE IMPAIRMENT: ICD-10-CM

## 2021-06-10 DIAGNOSIS — Z00.00 ENCOUNTER FOR ANNUAL HEALTH EXAMINATION: Primary | ICD-10-CM

## 2021-06-10 DIAGNOSIS — Z12.31 ENCOUNTER FOR SCREENING MAMMOGRAM FOR MALIGNANT NEOPLASM OF BREAST: ICD-10-CM

## 2021-06-10 DIAGNOSIS — G31.84 MILD COGNITIVE IMPAIRMENT: ICD-10-CM

## 2021-06-10 DIAGNOSIS — Z12.11 SCREENING FOR MALIGNANT NEOPLASM OF COLON: ICD-10-CM

## 2021-06-10 DIAGNOSIS — N18.31 STAGE 3A CHRONIC KIDNEY DISEASE (HCC): Chronic | ICD-10-CM

## 2021-06-10 DIAGNOSIS — I10 ESSENTIAL HYPERTENSION: ICD-10-CM

## 2021-06-10 PROCEDURE — 3008F BODY MASS INDEX DOCD: CPT | Performed by: INTERNAL MEDICINE

## 2021-06-10 PROCEDURE — 96160 PT-FOCUSED HLTH RISK ASSMT: CPT | Performed by: INTERNAL MEDICINE

## 2021-06-10 PROCEDURE — 3078F DIAST BP <80 MM HG: CPT | Performed by: INTERNAL MEDICINE

## 2021-06-10 PROCEDURE — G0439 PPPS, SUBSEQ VISIT: HCPCS | Performed by: INTERNAL MEDICINE

## 2021-06-10 PROCEDURE — 99397 PER PM REEVAL EST PAT 65+ YR: CPT | Performed by: INTERNAL MEDICINE

## 2021-06-10 PROCEDURE — 3074F SYST BP LT 130 MM HG: CPT | Performed by: INTERNAL MEDICINE

## 2021-06-10 RX ORDER — CHOLECALCIFEROL (VITAMIN D3) 50 MCG
1 TABLET ORAL DAILY
COMMUNITY

## 2021-06-25 RX ORDER — ESCITALOPRAM OXALATE 5 MG/1
TABLET ORAL
Qty: 90 TABLET | Refills: 0 | OUTPATIENT
Start: 2021-06-25

## 2021-06-25 NOTE — TELEPHONE ENCOUNTER
Pt reports not taking  Refill request has failed the Ambulatory Medication Refill Standing Order and is routed to the primary physician to review the following:    Requested Prescriptions     Refused Prescriptions Disp Refills   • ESCITALOPRAM 5 MG Oral Ta

## 2021-07-13 ENCOUNTER — VIRTUAL PHONE E/M (OUTPATIENT)
Dept: INTERNAL MEDICINE CLINIC | Facility: CLINIC | Age: 81
End: 2021-07-13
Payer: COMMERCIAL

## 2021-07-13 DIAGNOSIS — I89.0 LYMPHEDEMA: ICD-10-CM

## 2021-07-13 DIAGNOSIS — Z12.11 SCREENING FOR MALIGNANT NEOPLASM OF COLON: Primary | ICD-10-CM

## 2021-07-13 NOTE — PROGRESS NOTES
Virtual Telephone Check-In    Babs Gooden verbally consents to a Virtual/Telephone Check-In visit on 07/13/21. Patient has been referred to the [facility] website at [email or web address] to review the yearly Consent to Treat document.     Patient unders

## 2021-10-19 NOTE — PROGRESS NOTES
Chief Complaint:   Patient presents with:  Checkup: Reports constant ringing in ears for about 1 month         HPI:     Ms. Rick Leaks year old female past medical history of chronic lymphedema of bilateral lower extremities, chronic kidney disease, hy by mouth nightly as needed. • Calcium 600 MG Oral Tab Take 1 tablet daily 1 tablet 0   • magnesium 250 MG Oral Tab Take 250 mg by mouth daily.      • Elastic Bandages & Supports (JOBST 20-30MMHG COMPRESSION ) Does not apply Misc Knee high 6 each 0 thyromegaly  Cardiovascular: S1, S2, no S3, no S4, Regular rate and rhythm, No murmurs/gallops/rubs. Respiratory: Clear to auscultation bilaterally. No wheezes/rales/rhonchi  Gastrointestinal: Soft, nontender, nondistended.   Genitourinary: No CVA tender - 5.30 x10(6)uL    HGB 11.6 (L) 12.0 - 16.0 g/dL    HCT 37.4 35.0 - 48.0 %    MCV 74.9 (L) 80.0 - 100.0 fL    MCH 23.2 (L) 26.0 - 34.0 pg    MCHC 31.0 31.0 - 37.0 g/dL    RDW-SD 42.5 35.1 - 46.3 fL    RDW 15.9 (H) 11.0 - 15.0 %    .0 150.0 - 450.0 1 melatonin over-the-counter if needed as a sleep aid. –Referral to neurology for ongoing memory loss and further work-up.   Has not yet made an appointment     # Chronic Lymphedema  -History of chronic lymphedema for more than 20 years.  Unknown cause promp Oral Tab 90 tablet 3     Sig: Take 1 tablet (5 mg total) by mouth daily.        Imaging & Referrals:  FLU VACC HIGH DOSE PRSV FREE     Health Maintenance  –Recommended 2 doses of Shingrix    Spent 40 minutes obtaining history, evaluating patient, discussing

## 2021-10-19 NOTE — PATIENT INSTRUCTIONS
He was seen in clinic for hearing abnormality. On today's visit, we examined your ears and cleaned out ear wax on the right side. On the left side, there was no significant ear wax.  Otherwise, your inner ears appeared normal.    This may be early onset of

## 2021-10-20 ENCOUNTER — OFFICE VISIT (OUTPATIENT)
Dept: INTERNAL MEDICINE CLINIC | Facility: CLINIC | Age: 81
End: 2021-10-20
Payer: COMMERCIAL

## 2021-10-20 VITALS
TEMPERATURE: 98 F | BODY MASS INDEX: 20.28 KG/M2 | HEIGHT: 62.5 IN | HEART RATE: 84 BPM | OXYGEN SATURATION: 98 % | WEIGHT: 113 LBS | SYSTOLIC BLOOD PRESSURE: 120 MMHG | DIASTOLIC BLOOD PRESSURE: 66 MMHG

## 2021-10-20 DIAGNOSIS — I89.0 LYMPHEDEMA: ICD-10-CM

## 2021-10-20 DIAGNOSIS — R41.89 COGNITIVE IMPAIRMENT: ICD-10-CM

## 2021-10-20 DIAGNOSIS — N18.31 STAGE 3A CHRONIC KIDNEY DISEASE (HCC): Chronic | ICD-10-CM

## 2021-10-20 DIAGNOSIS — I10 ESSENTIAL HYPERTENSION: Primary | ICD-10-CM

## 2021-10-20 DIAGNOSIS — H93.13 TINNITUS OF BOTH EARS: ICD-10-CM

## 2021-10-20 PROCEDURE — 3008F BODY MASS INDEX DOCD: CPT | Performed by: INTERNAL MEDICINE

## 2021-10-20 PROCEDURE — 3074F SYST BP LT 130 MM HG: CPT | Performed by: INTERNAL MEDICINE

## 2021-10-20 PROCEDURE — 3078F DIAST BP <80 MM HG: CPT | Performed by: INTERNAL MEDICINE

## 2021-10-20 PROCEDURE — 99215 OFFICE O/P EST HI 40 MIN: CPT | Performed by: INTERNAL MEDICINE

## 2021-10-20 PROCEDURE — 90662 IIV NO PRSV INCREASED AG IM: CPT | Performed by: INTERNAL MEDICINE

## 2021-10-20 PROCEDURE — 69210 REMOVE IMPACTED EAR WAX UNI: CPT | Performed by: INTERNAL MEDICINE

## 2021-10-20 PROCEDURE — G0008 ADMIN INFLUENZA VIRUS VAC: HCPCS | Performed by: INTERNAL MEDICINE

## 2021-10-20 RX ORDER — AMLODIPINE BESYLATE 5 MG/1
5 TABLET ORAL DAILY
Qty: 90 TABLET | Refills: 3 | Status: SHIPPED | OUTPATIENT
Start: 2021-10-20

## 2021-10-28 ENCOUNTER — OFFICE VISIT (OUTPATIENT)
Dept: OTOLARYNGOLOGY | Facility: CLINIC | Age: 81
End: 2021-10-28
Payer: COMMERCIAL

## 2021-10-28 ENCOUNTER — OFFICE VISIT (OUTPATIENT)
Dept: AUDIOLOGY | Facility: CLINIC | Age: 81
End: 2021-10-28
Payer: COMMERCIAL

## 2021-10-28 VITALS — HEIGHT: 62.5 IN | WEIGHT: 113 LBS | BODY MASS INDEX: 20.28 KG/M2

## 2021-10-28 DIAGNOSIS — H93.19 TINNITUS, UNSPECIFIED LATERALITY: Primary | ICD-10-CM

## 2021-10-28 DIAGNOSIS — H91.13 PRESBYCUSIS OF BOTH EARS: ICD-10-CM

## 2021-10-28 DIAGNOSIS — H93.13 TINNITUS OF BOTH EARS: Primary | ICD-10-CM

## 2021-10-28 PROCEDURE — 3008F BODY MASS INDEX DOCD: CPT | Performed by: OTOLARYNGOLOGY

## 2021-10-28 PROCEDURE — 92557 COMPREHENSIVE HEARING TEST: CPT | Performed by: AUDIOLOGIST

## 2021-10-28 PROCEDURE — 99204 OFFICE O/P NEW MOD 45 MIN: CPT | Performed by: OTOLARYNGOLOGY

## 2021-10-28 PROCEDURE — 92567 TYMPANOMETRY: CPT | Performed by: AUDIOLOGIST

## 2021-10-28 NOTE — PROGRESS NOTES
Kaycee Hansen is a 79year old female. Patient presents with:  Ear Problem: pt presents today for both ears tinnitus on both ears for the past 2 months.       HISTORY OF PRESENT ILLNESS  10/28/2021  Patient presents for evaluation of tinnitus This has b Integumentary Negative Frequent skin infections, pigment change and rash. Hema/Lymph Negative Easy bleeding and easy bruising.            PHYSICAL EXAM    Ht 5' 2.5\" (1.588 m)   Wt 113 lb (51.3 kg)   BMI 20.34 kg/m²        Constitutional Normal Overall mouth daily. , Disp: , Rfl:   •  Elastic Bandages & Supports (JOBST 20-30MMHG COMPRESSION SM) Does not apply Misc, Knee high compression stockings to be worn daily, Disp: 6 each, Rfl: 0  •  ferrous sulfate 325 (65 FE) MG Oral Tab EC, Take 1 tablet (325 mg t prepared using [employer] [facility] voice recognition dictation software. As a result, errors may occur. When identified, these errors have been corrected.  While every attempt is made to correct errors during dictation, discrepancies may still exist

## 2021-11-12 RX ORDER — AMLODIPINE BESYLATE 5 MG/1
5 TABLET ORAL DAILY
Qty: 90 TABLET | Refills: 3 | OUTPATIENT
Start: 2021-11-12

## 2021-11-12 NOTE — TELEPHONE ENCOUNTER
Current refill request refused due to refill is either a duplicate request or has active refills at the pharmacy. Check previous templates.     Requested Prescriptions     Refused Prescriptions Disp Refills   • amLODIPine 5 MG Oral Tab 90 tablet 3     Sig:

## 2022-01-26 RX ORDER — AMLODIPINE BESYLATE 5 MG/1
5 TABLET ORAL DAILY
Qty: 90 TABLET | Refills: 3 | OUTPATIENT
Start: 2022-01-26

## 2022-02-08 NOTE — PATIENT INSTRUCTIONS
You were seen in clinic for chronic lymphedema of both legs with associated right lower extremity pain. You have had this for more than 20 years, we recommend evaluation by the lymphedema clinic to assist with further management.      Fair pain control we [FreeTextEntry1] : follow up since COVID-19 infection [de-identified] : Mr Stefan Reynolds is a 67 yo male presents today for follow up since COVID-19 infection. Pt has now completed 10 days of isolation, did not end up getting the monoclonal antibody infusion. Pt reports was improving and did not get the antibody infusion. Pt reports today no fever, chills, only mild cough improved, and only nasal congestion now. Pt concerned for infecting others, advised since now completed isolation, only shedding dead viral particles and should not be contagious at this time. Pt advised to increase warm hydration, warm humidified, can continue with cough suppressant if needed, and will fluticasone for nasal congestion.

## 2022-03-15 ENCOUNTER — TELEPHONE (OUTPATIENT)
Dept: INTERNAL MEDICINE CLINIC | Facility: CLINIC | Age: 82
End: 2022-03-15

## 2022-03-15 NOTE — TELEPHONE ENCOUNTER
I do not see another encounter regarding anyone calling [employer]. Routed to MD --- Dr. Nazanin Vasquez, did you try calling [employer]?

## 2022-03-21 ENCOUNTER — TELEPHONE (OUTPATIENT)
Dept: INTERNAL MEDICINE CLINIC | Facility: CLINIC | Age: 82
End: 2022-03-21

## 2022-03-21 NOTE — TELEPHONE ENCOUNTER
Called patient and advised to schedule office visit for evaluation - transferred to [facility] to schedule visit with

## 2022-03-21 NOTE — TELEPHONE ENCOUNTER
Patient is calling to speak with Dr Noa Keller regarding some leg pain. Patient states the pain started in her right leg some time ago and has been bothering her more at night. Patient is states she has not been taking anything to help the pain. Patient is looking to speak with a nurse or Dr Noa Keller regarding this pain, nothing further was shared with .       # [phone]

## 2022-04-10 ENCOUNTER — TELEPHONE (OUTPATIENT)
Dept: INTERNAL MEDICINE CLINIC | Facility: CLINIC | Age: 82
End: 2022-04-10

## 2022-04-12 ENCOUNTER — HOSPITAL ENCOUNTER (OUTPATIENT)
Dept: GENERAL RADIOLOGY | Facility: HOSPITAL | Age: 82
Discharge: HOME | End: 2022-04-12
Attending: INTERNAL MEDICINE
Payer: MEDICARE

## 2022-04-12 ENCOUNTER — TELEPHONE (OUTPATIENT)
Dept: INTERNAL MEDICINE CLINIC | Facility: CLINIC | Age: 82
End: 2022-04-12

## 2022-04-12 DIAGNOSIS — M25.552 LEFT HIP PAIN: ICD-10-CM

## 2022-04-12 PROCEDURE — 73502 X-RAY EXAM HIP UNI 2-3 VIEWS: CPT | Performed by: INTERNAL MEDICINE

## 2022-04-12 NOTE — TELEPHONE ENCOUNTER
Please call patient's daughter Dariel as I reviewed the medication list, atorvastatin was not refilled and should be resumed given the high cholesterol levels were reviewed in clinic today.   Otherwise no new recommendations, will await the results of the tests we sent today    Please also send the MoCA test to scanning

## 2022-04-12 NOTE — TELEPHONE ENCOUNTER
Cayla'e called and relayed Dr Sarah Baumann' message. Daughter states Atorvastatin cause pain to patient's lymphedema legs when she was on medication and took natural medication instead which lowered cholesterol. Cayla'e states they will go back on natural medication since they stopped taking it. Dr Sarah Baumann made aware and pharmacy called to cancel Atorvastatin.

## 2022-04-13 ENCOUNTER — TELEPHONE (OUTPATIENT)
Dept: INTERNAL MEDICINE CLINIC | Facility: CLINIC | Age: 82
End: 2022-04-13

## 2022-04-13 NOTE — TELEPHONE ENCOUNTER
Spoke to patients daughter (okay per hippa) and verbalized MD message below, she verbalized understanding and agreed to plan, no further questions or concerns, phone number also given for PT

## 2022-04-27 ENCOUNTER — PATIENT MESSAGE (OUTPATIENT)
Dept: INTERNAL MEDICINE CLINIC | Facility: CLINIC | Age: 82
End: 2022-04-27

## 2022-05-01 ENCOUNTER — TELEPHONE (OUTPATIENT)
Dept: INTERNAL MEDICINE CLINIC | Facility: CLINIC | Age: 82
End: 2022-05-01

## 2022-07-21 LAB — AMB EXT COVID-19 RESULT: DETECTED

## 2022-07-22 ENCOUNTER — TELEPHONE (OUTPATIENT)
Dept: INTERNAL MEDICINE CLINIC | Facility: CLINIC | Age: 82
End: 2022-07-22

## 2022-07-22 NOTE — TELEPHONE ENCOUNTER
Respiratory infection triage:    Fever:  []  No fever  [x]  Fever>100.4 -   Cough:  [] Tight cough  [] Cough with exertion  [x] Dry cough  [] Sputum production, Color:     Breathing:  [] Mild shortness of breath interfering with activity  [x] Wheezing  [] Pain with deep breathing  [] Using inhaler    Other symptoms:  [x] Sore throat  [] Difficulty swallowing  [x] Nasal drainage/congestion  [] Sinus congestion/pressure  [] Ear pain  [x] Body aches  [] Poor appetite  [] Loss of sense of smell   [] Loss of sense of taste  []Conjunctivitis? [] Any recent travel? [] Any sick contacts? [] Are you a healthcare worker? ADDITIONAL NOTES:  Please advise - called daughter and son per hipaa - patient tested positive for covid today. Besides above SX she is also more confused , gets the chills , very weak and ids nauseated . They want to know about ant viral medication and OTC Meds she can take which will not interfere with the meds she is on - to DR. FUNES        Notified patient that we will route this message to the doctor and see what their recommendations would be. In the meantime, if anything worsens, they were advised to call back or seek emergent evaluation.

## 2022-07-22 NOTE — TELEPHONE ENCOUNTER
Pt tested positive for covid yesterday  What treatments would be avail for patient  Daughter advises pt's confusion is a lot worse  She also has   Fever   Chills   Nausea  Sore throat  Weak    Please call daughter Maria Del Rosario Walker    [phone]

## 2022-07-22 NOTE — TELEPHONE ENCOUNTER
Patient is a candidate for Paxlovid:    Should be renally dosed: Nirmatrelvir 150 mg and ritonavir 100 mg twice daily (noted given to pharmacy)    Should hold atorvastatin for 5 days while on Paxlovid. Should also hold any supplements for now. Can use over-the-counter cough medications, nasal spray such as Flonase, Tylenol for fevers and body aches.     If any worsening symptoms, may need to be seen in the ER as recommended

## 2022-07-25 RX ORDER — AMLODIPINE BESYLATE 5 MG/1
5 TABLET ORAL DAILY
Qty: 90 TABLET | Refills: 3 | OUTPATIENT
Start: 2022-07-25

## 2022-07-25 NOTE — TELEPHONE ENCOUNTER
Called patient , spoke with Zak per hipaa who states patient is feeling a lot better - as FYI to DR. FUNES

## 2022-07-26 ENCOUNTER — PATIENT MESSAGE (OUTPATIENT)
Dept: INTERNAL MEDICINE CLINIC | Facility: CLINIC | Age: 82
End: 2022-07-26

## 2022-07-26 NOTE — TELEPHONE ENCOUNTER
From: Priya Carreno  To: Jem Medina MD  Sent: 7/26/2022 1:34 PM CDT  Subject: High blood pressure medication for Kassandra Old    So sorry about the previous message. We found her high blood pressure medication.    My apologies for previous message  Sapna Royal

## 2022-10-11 ENCOUNTER — TELEPHONE (OUTPATIENT)
Dept: INTERNAL MEDICINE CLINIC | Facility: CLINIC | Age: 82
End: 2022-10-11

## 2022-10-11 NOTE — TELEPHONE ENCOUNTER
Patient was called by  to schedule a super medicare annual with Dr Joss Abdi. Patient's daughter answered the phone and informed  that she will have the patient call the office back to schedule. Last medicare: 6/10/2021    Awaiting patient's call back.

## 2022-10-27 ENCOUNTER — TELEPHONE (OUTPATIENT)
Dept: INTERNAL MEDICINE CLINIC | Facility: CLINIC | Age: 82
End: 2022-10-27

## 2022-10-27 NOTE — TELEPHONE ENCOUNTER
Patient was called by  to schedule a super medicare annual with Dr Jaja Mooney. No answer. A message was left to call back.      Last Medicare: 6/10/2021

## 2022-11-23 ENCOUNTER — TELEPHONE (OUTPATIENT)
Dept: PHYSICAL THERAPY | Facility: HOSPITAL | Age: 82
End: 2022-11-23

## 2022-11-29 ENCOUNTER — APPOINTMENT (OUTPATIENT)
Dept: PHYSICAL THERAPY | Facility: HOSPITAL | Age: 82
End: 2022-11-29
Attending: INTERNAL MEDICINE
Payer: MEDICARE

## 2022-11-29 ENCOUNTER — TELEPHONE (OUTPATIENT)
Dept: PHYSICAL THERAPY | Facility: HOSPITAL | Age: 82
End: 2022-11-29

## 2022-11-29 NOTE — TELEPHONE ENCOUNTER
Pt did not show for her evaluation, attempted to call pt (no answer), called Dtr Gloria Guillen number- no answer but was able to leave vm message asking for them to call back- since pt missed evaluation will need to reschedule if pt is to proceed w/ lymphedema therapy (next appt w/ PTA therefore cannot do treats until evaluation is completed).

## 2022-11-30 ENCOUNTER — TELEPHONE (OUTPATIENT)
Dept: PHYSICAL THERAPY | Facility: HOSPITAL | Age: 82
End: 2022-11-30

## 2022-11-30 NOTE — TELEPHONE ENCOUNTER
Pt/dtr have not returned call, called again- able to reach patient Luigi Martinez to discuss her lymphedema therapy appts. Pt stating she did not know she has therapy scheduled (pt did not show for her evaluation yesterday), asked pt if she wanted to proceed w/ lymphedema therapy- pt asking therapist to call her dtr \"I'm very forgetful\". Attempted to call dtr Mor, left voicemail message requesting call back to confirm if her mother is going to proceed w/ lymphedema therapy. New evaluation scheduled for 12/2/2022 at 12:45, explained if pt does not show for appt and no return call, all future appts will be canceled.

## 2022-12-02 ENCOUNTER — APPOINTMENT (OUTPATIENT)
Dept: PHYSICAL THERAPY | Facility: HOSPITAL | Age: 82
End: 2022-12-02
Attending: INTERNAL MEDICINE
Payer: MEDICARE

## 2022-12-02 ENCOUNTER — TELEPHONE (OUTPATIENT)
Dept: PHYSICAL THERAPY | Facility: HOSPITAL | Age: 82
End: 2022-12-02

## 2022-12-02 NOTE — TELEPHONE ENCOUNTER
Pt did not show for appt- called dtr. Dtr stating they had arranged for transportation through Medicare and they did not show up. Dtr stating mother needs/wants to come to therapy. Family is arranging transportation through a caregiver \"and will make sure she is there\". Dtr understands if pt misses next appt all future appts will be cx.

## 2022-12-06 ENCOUNTER — OFFICE VISIT (OUTPATIENT)
Dept: PHYSICAL THERAPY | Facility: HOSPITAL | Age: 82
End: 2022-12-06
Attending: INTERNAL MEDICINE
Payer: MEDICARE

## 2022-12-06 DIAGNOSIS — I89.0 LYMPHEDEMA: ICD-10-CM

## 2022-12-06 PROCEDURE — 97163 PT EVAL HIGH COMPLEX 45 MIN: CPT | Performed by: PHYSICAL THERAPIST

## 2022-12-06 PROCEDURE — 97140 MANUAL THERAPY 1/> REGIONS: CPT | Performed by: PHYSICAL THERAPIST

## 2022-12-06 NOTE — PATIENT INSTRUCTIONS
HOME PUMP:  Monday: right leg  Tuesday: left leg  Wednesday: right leg  Thursday: left leg  Friday: right leg  Saturday: left leg  Kenny day off      COMPRESSION SOCKS:  Wear your compression socks every day  When in your home, put the new Velcro wraps on your feet, put on over the stockings, then put your footy socks (with the no slip dots on them) over the Velcro wraps. You have pictures and a video on your phone how to do it.  Your shoes do not fit over the wraps, so if you have to go out, take your Velcro wraps off            THERAPY SCHEDULE:  Call [phone] if cannot come      12/7/2022 Wednesday    12 noon  12/9/2022 Friday       7:30 AM  12/13/2022 Tuesday  9:00 AM  12/16/2022 Friday   10:30 AM  12/20/2022 Tuesday  10:30 AM  12/23/2022 Friday   10:30 AM  12/27/2022 Tuesday  12 noon   1/3/2023 Tuesday   10:30 AM  1/10/2023 Tuesday   10:30 AM

## 2022-12-07 ENCOUNTER — OFFICE VISIT (OUTPATIENT)
Dept: PHYSICAL THERAPY | Facility: HOSPITAL | Age: 82
End: 2022-12-07
Attending: INTERNAL MEDICINE
Payer: MEDICARE

## 2022-12-07 PROCEDURE — 97110 THERAPEUTIC EXERCISES: CPT

## 2022-12-07 PROCEDURE — 97140 MANUAL THERAPY 1/> REGIONS: CPT

## 2022-12-07 NOTE — PATIENT INSTRUCTIONS
Patient asked what type of lotion we use in the clinic:  [address]    2.   ELEVATE LEGS when able. 3. Used a LONG HANDLEDE SHOE HORN in clinic to help get shoes on over her new velcro foot/ankle wraps      4. Do exercises daily (see sheet given to patient) - EXERCISING THE LEGS IS IMPORTANT TO HELP FACILITATE MUSCLE PUMPING THAT HELPS GET RID OF SOME OF THE FLUID IN THE LEGS. ESPECIALLY IN THE LOWER LEG FOOT AND ANKLE. THE EXERCISES ALSO HELP WITH FLEXIBILITY AND STRENGTH.       HOME EXERCISES added today:

## 2022-12-08 ENCOUNTER — APPOINTMENT (OUTPATIENT)
Dept: PHYSICAL THERAPY | Facility: HOSPITAL | Age: 82
End: 2022-12-08
Attending: INTERNAL MEDICINE
Payer: MEDICARE

## 2022-12-09 ENCOUNTER — APPOINTMENT (OUTPATIENT)
Dept: PHYSICAL THERAPY | Facility: HOSPITAL | Age: 82
End: 2022-12-09
Attending: INTERNAL MEDICINE
Payer: MEDICARE

## 2022-12-13 ENCOUNTER — OFFICE VISIT (OUTPATIENT)
Dept: PHYSICAL THERAPY | Facility: HOSPITAL | Age: 82
End: 2022-12-13
Attending: INTERNAL MEDICINE
Payer: MEDICARE

## 2022-12-13 PROCEDURE — 97110 THERAPEUTIC EXERCISES: CPT | Performed by: PHYSICAL THERAPIST

## 2022-12-13 PROCEDURE — 97140 MANUAL THERAPY 1/> REGIONS: CPT | Performed by: PHYSICAL THERAPIST

## 2022-12-14 ENCOUNTER — APPOINTMENT (OUTPATIENT)
Dept: PHYSICAL THERAPY | Facility: HOSPITAL | Age: 82
End: 2022-12-14
Attending: INTERNAL MEDICINE
Payer: MEDICARE

## 2022-12-16 ENCOUNTER — APPOINTMENT (OUTPATIENT)
Dept: PHYSICAL THERAPY | Facility: HOSPITAL | Age: 82
End: 2022-12-16
Attending: INTERNAL MEDICINE
Payer: MEDICARE

## 2022-12-20 ENCOUNTER — OFFICE VISIT (OUTPATIENT)
Dept: PHYSICAL THERAPY | Facility: HOSPITAL | Age: 82
End: 2022-12-20
Attending: INTERNAL MEDICINE
Payer: MEDICARE

## 2022-12-20 PROCEDURE — 97140 MANUAL THERAPY 1/> REGIONS: CPT

## 2022-12-20 PROCEDURE — 97110 THERAPEUTIC EXERCISES: CPT

## 2022-12-21 ENCOUNTER — APPOINTMENT (OUTPATIENT)
Dept: PHYSICAL THERAPY | Facility: HOSPITAL | Age: 82
End: 2022-12-21
Attending: INTERNAL MEDICINE
Payer: MEDICARE

## 2022-12-23 ENCOUNTER — APPOINTMENT (OUTPATIENT)
Dept: PHYSICAL THERAPY | Facility: HOSPITAL | Age: 82
End: 2022-12-23
Attending: INTERNAL MEDICINE
Payer: MEDICARE

## 2022-12-27 ENCOUNTER — OFFICE VISIT (OUTPATIENT)
Dept: PHYSICAL THERAPY | Facility: HOSPITAL | Age: 82
End: 2022-12-27
Attending: INTERNAL MEDICINE
Payer: MEDICARE

## 2022-12-27 PROCEDURE — 97110 THERAPEUTIC EXERCISES: CPT

## 2022-12-27 PROCEDURE — 97140 MANUAL THERAPY 1/> REGIONS: CPT

## 2022-12-28 ENCOUNTER — APPOINTMENT (OUTPATIENT)
Dept: PHYSICAL THERAPY | Facility: HOSPITAL | Age: 82
End: 2022-12-28
Attending: INTERNAL MEDICINE
Payer: MEDICARE

## 2022-12-30 ENCOUNTER — APPOINTMENT (OUTPATIENT)
Dept: PHYSICAL THERAPY | Facility: HOSPITAL | Age: 82
End: 2022-12-30
Attending: INTERNAL MEDICINE
Payer: MEDICARE

## 2023-01-03 ENCOUNTER — OFFICE VISIT (OUTPATIENT)
Dept: PHYSICAL THERAPY | Facility: HOSPITAL | Age: 83
End: 2023-01-03
Attending: INTERNAL MEDICINE
Payer: MEDICARE

## 2023-01-03 PROCEDURE — 97140 MANUAL THERAPY 1/> REGIONS: CPT

## 2023-01-03 NOTE — PATIENT INSTRUCTIONS
HOME INSTRUCTIONS:    -WE USE EUCERIN LOTION IN THE CLINIC. -YOU SHOULD USE LOTION ON YOUR LEGS DAILY. -IF YOU PUT YOUR LOTION ON FIRST THING IN THE MORNING YOU SHOULD WAIT ABOUT 30 minutes TO USE YOUR HOME PUMP. -IF YOU PUT YOUR LOTION ON ANY TIME DURING THE DAY you should wait about 30 minutes to put on your compression stockings.

## 2023-01-04 ENCOUNTER — APPOINTMENT (OUTPATIENT)
Dept: PHYSICAL THERAPY | Facility: HOSPITAL | Age: 83
End: 2023-01-04
Attending: INTERNAL MEDICINE
Payer: MEDICARE

## 2023-01-06 ENCOUNTER — APPOINTMENT (OUTPATIENT)
Dept: PHYSICAL THERAPY | Facility: HOSPITAL | Age: 83
End: 2023-01-06
Attending: INTERNAL MEDICINE
Payer: MEDICARE

## 2023-01-10 ENCOUNTER — OFFICE VISIT (OUTPATIENT)
Dept: PHYSICAL THERAPY | Facility: HOSPITAL | Age: 83
End: 2023-01-10
Attending: INTERNAL MEDICINE
Payer: MEDICARE

## 2023-01-10 PROCEDURE — 97140 MANUAL THERAPY 1/> REGIONS: CPT

## 2023-01-11 ENCOUNTER — APPOINTMENT (OUTPATIENT)
Dept: PHYSICAL THERAPY | Facility: HOSPITAL | Age: 83
End: 2023-01-11
Attending: INTERNAL MEDICINE
Payer: MEDICARE

## 2023-01-13 ENCOUNTER — APPOINTMENT (OUTPATIENT)
Dept: PHYSICAL THERAPY | Facility: HOSPITAL | Age: 83
End: 2023-01-13
Attending: INTERNAL MEDICINE
Payer: MEDICARE

## 2023-04-27 ENCOUNTER — HOSPITAL ENCOUNTER (EMERGENCY)
Facility: HOSPITAL | Age: 83
Discharge: HOME | End: 2023-04-27
Attending: EMERGENCY MEDICINE
Payer: MEDICARE

## 2023-04-27 ENCOUNTER — APPOINTMENT (OUTPATIENT)
Dept: GENERAL RADIOLOGY | Facility: HOSPITAL | Age: 83
End: 2023-04-27
Attending: EMERGENCY MEDICINE
Payer: MEDICARE

## 2023-04-27 VITALS
BODY MASS INDEX: 20.71 KG/M2 | HEART RATE: 50 BPM | HEIGHT: 63 IN | TEMPERATURE: 98 F | SYSTOLIC BLOOD PRESSURE: 134 MMHG | RESPIRATION RATE: 19 BRPM | OXYGEN SATURATION: 100 % | DIASTOLIC BLOOD PRESSURE: 63 MMHG | WEIGHT: 116.88 LBS

## 2023-04-27 DIAGNOSIS — R42 DIZZINESS: Primary | ICD-10-CM

## 2023-04-27 LAB
ANION GAP SERPL CALC-SCNC: 6 MMOL/L (ref 0–18)
ATRIAL RATE: 104 BPM
BASOPHILS # BLD AUTO: 0.03 X10(3) UL (ref 0–0.2)
BASOPHILS NFR BLD AUTO: 0.6 %
BUN BLD-MCNC: 21 MG/DL (ref 7–18)
BUN/CREAT SERPL: 24.1 (ref 10–20)
CALCIUM BLD-MCNC: 9.4 MG/DL (ref 8.5–10.1)
CHLORIDE SERPL-SCNC: 108 MMOL/L (ref 98–112)
CO2 SERPL-SCNC: 26 MMOL/L (ref 21–32)
CREAT BLD-MCNC: 0.87 MG/DL
DEPRECATED RDW RBC AUTO: 43.7 FL (ref 35.1–46.3)
EOSINOPHIL # BLD AUTO: 0.06 X10(3) UL (ref 0–0.7)
EOSINOPHIL NFR BLD AUTO: 1.2 %
ERYTHROCYTE [DISTWIDTH] IN BLOOD BY AUTOMATED COUNT: 16.2 % (ref 11–15)
GFR SERPLBLD BASED ON 1.73 SQ M-ARVRAT: 67 ML/MIN/1.73M2 (ref 60–?)
GLUCOSE BLD-MCNC: 86 MG/DL (ref 70–99)
GLUCOSE BLDC GLUCOMTR-MCNC: 86 MG/DL (ref 70–99)
HCT VFR BLD AUTO: 39.6 %
HGB BLD-MCNC: 12.4 G/DL
IMM GRANULOCYTES # BLD AUTO: 0.02 X10(3) UL (ref 0–1)
IMM GRANULOCYTES NFR BLD: 0.4 %
LYMPHOCYTES # BLD AUTO: 2.53 X10(3) UL (ref 1–4)
LYMPHOCYTES NFR BLD AUTO: 52 %
MCH RBC QN AUTO: 23.7 PG (ref 26–34)
MCHC RBC AUTO-ENTMCNC: 31.3 G/DL (ref 31–37)
MCV RBC AUTO: 75.7 FL
MONOCYTES # BLD AUTO: 0.57 X10(3) UL (ref 0.1–1)
MONOCYTES NFR BLD AUTO: 11.7 %
NEUTROPHILS # BLD AUTO: 1.66 X10 (3) UL (ref 1.5–7.7)
NEUTROPHILS # BLD AUTO: 1.66 X10(3) UL (ref 1.5–7.7)
NEUTROPHILS NFR BLD AUTO: 34.1 %
OSMOLALITY SERPL CALC.SUM OF ELEC: 292 MOSM/KG (ref 275–295)
P AXIS: 15 DEGREES
P-R INTERVAL: 104 MS
PLATELET # BLD AUTO: 214 10(3)UL (ref 150–450)
POTASSIUM SERPL-SCNC: 4.6 MMOL/L (ref 3.5–5.1)
Q-T INTERVAL: 446 MS
QRS DURATION: 92 MS
QTC CALCULATION (BEZET): 414 MS
R AXIS: 21 DEGREES
RBC # BLD AUTO: 5.23 X10(6)UL
SODIUM SERPL-SCNC: 140 MMOL/L (ref 136–145)
T AXIS: 23 DEGREES
TROPONIN I HIGH SENSITIVITY: 5 NG/L
VENTRICULAR RATE: 52 BPM
WBC # BLD AUTO: 4.9 X10(3) UL (ref 4–11)

## 2023-04-27 PROCEDURE — 99285 EMERGENCY DEPT VISIT HI MDM: CPT

## 2023-04-27 PROCEDURE — 93010 ELECTROCARDIOGRAM REPORT: CPT

## 2023-04-27 PROCEDURE — 99284 EMERGENCY DEPT VISIT MOD MDM: CPT

## 2023-04-27 PROCEDURE — 93005 ELECTROCARDIOGRAM TRACING: CPT

## 2023-04-27 PROCEDURE — 96360 HYDRATION IV INFUSION INIT: CPT

## 2023-04-27 PROCEDURE — 71045 X-RAY EXAM CHEST 1 VIEW: CPT | Performed by: EMERGENCY MEDICINE

## 2023-04-27 PROCEDURE — 82962 GLUCOSE BLOOD TEST: CPT

## 2023-04-27 PROCEDURE — 80048 BASIC METABOLIC PNL TOTAL CA: CPT | Performed by: EMERGENCY MEDICINE

## 2023-04-27 PROCEDURE — 85025 COMPLETE CBC W/AUTO DIFF WBC: CPT | Performed by: EMERGENCY MEDICINE

## 2023-04-27 PROCEDURE — 84484 ASSAY OF TROPONIN QUANT: CPT | Performed by: EMERGENCY MEDICINE

## 2023-04-27 NOTE — ED INITIAL ASSESSMENT (HPI)
Two episodes of dizziness after trying to stand up - one with self and one with ems. No loc, blurred vision or gait changes. htn hx.

## 2023-07-27 ENCOUNTER — TELEPHONE (OUTPATIENT)
Dept: INTERNAL MEDICINE CLINIC | Facility: CLINIC | Age: 83
End: 2023-07-27

## 2023-08-28 ENCOUNTER — TELEPHONE (OUTPATIENT)
Dept: INTERNAL MEDICINE CLINIC | Facility: CLINIC | Age: 83
End: 2023-08-28

## 2023-08-28 NOTE — TELEPHONE ENCOUNTER
Spoke with Von. Relayed MD message. Von verbalized understanding.    Pt scheduled for tomorrow at 5:30 pm.

## 2023-08-28 NOTE — TELEPHONE ENCOUNTER
Please advise - called daughter per HIPAA ( lives out of state) who states patient fell- unsure exactly when ,patient has Dementia. Her left leg is hurting her but she can walk. Patient has caregiver [facility] and daughter wants patient to be seen -has alina 9/14/23 . Thanks - to DR. Syed

## 2023-08-28 NOTE — TELEPHONE ENCOUNTER
Can add tomorrow at 530. If concern for injury, significant pain however best to be seen as soon as possible urgent care or ER if appropriate.   If no significant injury, okay to see and wait till tomorrow

## 2023-08-28 NOTE — TELEPHONE ENCOUNTER
Please call patient daughter, Von  Patient fell, hurt leg, Von not exactly sure when patient fell because of patient memory, 3-4 days or perhaps a week  Patient currently scheduled for appt on 9/14 /23, can patient be seen/added to the schedule this week on Tuesday, Wed or Thursday with Dr Anne Alanizutant or Dr Dominga Womack when her caregiver is available?   (No openings)  Tasked to nursing     Please call Krystenme, not patient, thank you

## 2023-08-29 ENCOUNTER — OFFICE VISIT (OUTPATIENT)
Dept: INTERNAL MEDICINE CLINIC | Facility: CLINIC | Age: 83
End: 2023-08-29

## 2023-08-29 VITALS
HEIGHT: 62.5 IN | SYSTOLIC BLOOD PRESSURE: 110 MMHG | HEART RATE: 57 BPM | WEIGHT: 122.63 LBS | OXYGEN SATURATION: 98 % | BODY MASS INDEX: 22 KG/M2 | DIASTOLIC BLOOD PRESSURE: 70 MMHG | TEMPERATURE: 98 F

## 2023-08-29 DIAGNOSIS — I10 ESSENTIAL HYPERTENSION: ICD-10-CM

## 2023-08-29 DIAGNOSIS — R41.89 COGNITIVE IMPAIRMENT: ICD-10-CM
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
## 2023-08-29 DIAGNOSIS — N18.31 STAGE 3A CHRONIC KIDNEY DISEASE (HCC): Chronic | ICD-10-CM

## 2023-08-29 PROCEDURE — 1160F RVW MEDS BY RX/DR IN RCRD: CPT | Performed by: INTERNAL MEDICINE

## 2023-08-29 PROCEDURE — 3078F DIAST BP <80 MM HG: CPT | Performed by: INTERNAL MEDICINE

## 2023-08-29 PROCEDURE — 99214 OFFICE O/P EST MOD 30 MIN: CPT | Performed by: INTERNAL MEDICINE

## 2023-08-29 PROCEDURE — 1159F MED LIST DOCD IN RCRD: CPT | Performed by: INTERNAL MEDICINE

## 2023-08-29 PROCEDURE — 1125F AMNT PAIN NOTED PAIN PRSNT: CPT | Performed by: INTERNAL MEDICINE

## 2023-08-29 PROCEDURE — 3008F BODY MASS INDEX DOCD: CPT | Performed by: INTERNAL MEDICINE

## 2023-08-29 PROCEDURE — 3074F SYST BP LT 130 MM HG: CPT | Performed by: INTERNAL MEDICINE

## 2023-08-29 RX ORDER — AMLODIPINE BESYLATE 5 MG/1
5 TABLET ORAL DAILY
Qty: 90 TABLET | Refills: 3 | Status: SHIPPED | OUTPATIENT
Start: 2023-08-29

## 2023-08-29 RX ORDER — ESCITALOPRAM OXALATE 5 MG/1
5 TABLET ORAL DAILY
Qty: 90 TABLET | Refills: 3 | Status: SHIPPED | OUTPATIENT
Start: 2023-08-29

## 2023-09-20 ENCOUNTER — TELEPHONE (OUTPATIENT)
Dept: INTERNAL MEDICINE CLINIC | Facility: CLINIC | Age: 83
End: 2023-09-20

## 2023-11-14 NOTE — PATIENT INSTRUCTIONS
- You were seen in clinic for regular annual check-up. We have ordered labs for you and we will call you with the results. Please obtain the bloodwork fasting for12 hours. OK to drink water the day of your blood draw. We have the lab downstairs on the first floor. No appointment is necessary. Lab hours are Monday-Friday 7:30 AM to 4:45 PM.  There may be Saturday hours 7 AM-11:00 AM as well. Otherwise you can obtain the blood tests on the weekends at the main hospital or local immediate care/urgent care within the [facility]. -Today, we focused on your ongoing changes in memory. This is likely progressive for which we should try to maintain good mental activities, physical activities, nutrition and exercise. Previously, you have done a great job of keeping notes for appointments. May be good to keep this up as this will help as memory assistant tool    We will start donepezil 5 mg nightly. Go ahead and start the this evening. Try to monitor for any dizziness, drowsiness. These should stop after 1-2 weeks. Effect this medication may not be apparent until about 3-6 weeks. - Lets continue trying to manage anxiety with the current medications in place    Starting tomorrow, increase her Lexapro to 2 tablets or 10 mg once a day. - We did place a refill for 10 mg once a day moving forward. Effects may take 3-6 weeks to take effect. - We discussed that given advanced age, we can defer further colonoscopy, mammogram  - Continue following with the lymphedema clinic for management of chronic leg swelling  -Please continue checking your blood pressures at home and notify us if they are increasing   -Vaccines you may be due for: Flu shot, Prevnar 20, COVID dose #4, RSV vaccine, We recommended checking with your insurance for coverage of Shingrix, a 2-dose shingles vaccine that can be obtained at the pharmacy if there is adequate coverage through your insurance plan.   - Please continue to eat a varied diet including recommended servings of vegetables, fruits, and low fat dairy. Minimize high saturated fats (such as fast foods) and high sugar intake (such as soda)  - We recommend 150 minutes of moderate intensity exercise (brisk walking, swimming) weekly to maintain your current weight. Targeted weight loss will require more vigorous exercise or more than 150 minutes/week. Return to clinic in 3-4 months for follow-up    [address] [place]   Tests on this list are recommended by your physician but may not be covered, or covered at this frequency, by your insurer. Please check with your insurance carrier before scheduling to verify coverage.    PREVENTATIVE SERVICES FREQUENCY &  COVERAGE DETAILS LAST COMPLETION DATE   Diabetes Screening    Fasting Blood Sugar /  Glucose    One screening every 12 months if never tested or if previously tested but not diagnosed with pre-diabetes   One screening every 6 months if diagnosed with pre-diabetes Lab Results   Component Value Date    GLU 86 04/27/2023        Cardiovascular Disease Screening    Lipid Panel  Cholesterol  Lipoprotein (HDL)  Triglycerides Covered every 5 years for all Medicare beneficiaries without apparent signs or symptoms of cardiovascular disease Lab Results   Component Value Date    CHOLEST 296 (H) 06/25/2020    HDL 93 (H) 06/25/2020     (H) 06/25/2020    TRIG 85 06/25/2020         Electrocardiogram (EKG)   Covered if needed at Welcome to Medicare, and non-screening if indicated for medical reasons 04/27/2023      Ultrasound Screening for Abdominal Aortic Aneurysm (AAA) Covered once in a lifetime for one of the following risk factors    Men who are 73-68 years old and have ever smoked    Anyone with a family history -     Colorectal Cancer Screening  Covered for ages 52-80; only need ONE of the following:    Colonoscopy   Covered every 10 years    Covered every 2 years if patient is at high risk or previous colonoscopy was abnormal -    No recommendations at this time    Flexible Sigmoidoscopy   Covered every 4 years -    Fecal Occult Blood Test Covered annually -   Bone Density Screening    Bone density screening    Covered every 2 years after age 72 if diagnosed with risk of osteoporosis or estrogen deficiency. Covered yearly for long-term glucocorticoid medication use (Steroids) Last Dexa Scan:    XR DEXA BONE DENSITOMETRY (CPT=77080) 06/26/2020      No recommendations at this time   Pap and Pelvic    Pap   Covered every 2 years for women at normal risk;  Annually if at high risk -  No recommendations at this time    Chlamydia Annually if high risk -  No recommendations at this time   Screening Mammogram    Mammogram     Recommend annually for all female patients aged 36 and older    One baseline mammogram covered for patients aged 32-38 -    No recommendations at this time    Immunizations    Influenza Covered once per flu season  Please get every year -  Influenza Vaccine(1) due on 10/01/2023    Pneumococcal Each vaccine (Bjtrxtg02 & Nxhkfuahl45) covered once after 65 Prevnar 13: -    Ekrjaadbe81: 06/10/2020     Pneumococcal Vaccination(2 - PCV) due on 06/10/2021    Hepatitis B One screening covered for patients with certain risk factors   04/19/2010  No recommendations at this time    Tetanus Toxoid Not covered by Medicare Part B unless medically necessary (cut with metal); may be covered with your pharmacy prescription benefits -    Tetanus, Diptheria and Pertusis TD and TDaP Not covered by Medicare Part B -  No recommendations at this time    Zoster Not covered by Medicare Part B; may be covered with your pharmacy  prescription benefits -  Zoster Vaccines(1 of 2) Never done

## 2023-11-16 ENCOUNTER — OFFICE VISIT (OUTPATIENT)
Dept: INTERNAL MEDICINE CLINIC | Facility: CLINIC | Age: 83
End: 2023-11-16

## 2023-11-16 VITALS
SYSTOLIC BLOOD PRESSURE: 120 MMHG | DIASTOLIC BLOOD PRESSURE: 60 MMHG | HEIGHT: 62.2 IN | OXYGEN SATURATION: 96 % | HEART RATE: 60 BPM | WEIGHT: 120 LBS | TEMPERATURE: 98 F | BODY MASS INDEX: 21.8 KG/M2

## 2023-11-16 DIAGNOSIS — Z00.00 ANNUAL PHYSICAL EXAM: Primary | ICD-10-CM

## 2023-11-16 DIAGNOSIS — I89.0 LYMPHEDEMA: ICD-10-CM

## 2023-11-16 DIAGNOSIS — I10 ESSENTIAL HYPERTENSION: ICD-10-CM

## 2023-11-16 DIAGNOSIS — R41.89 COGNITIVE IMPAIRMENT: ICD-10-CM

## 2023-11-16 DIAGNOSIS — Z13.29 SCREENING FOR THYROID DISORDER: ICD-10-CM

## 2023-11-16 DIAGNOSIS — E55.9 VITAMIN D DEFICIENCY: ICD-10-CM

## 2023-11-16 DIAGNOSIS — Z13.1 SCREENING FOR DIABETES MELLITUS: ICD-10-CM

## 2023-11-16 DIAGNOSIS — Z12.31 ENCOUNTER FOR SCREENING MAMMOGRAM FOR BREAST CANCER: ICD-10-CM

## 2023-11-16 DIAGNOSIS — E78.5 DYSLIPIDEMIA: ICD-10-CM

## 2023-11-16 DIAGNOSIS — Z13.0 SCREENING FOR DEFICIENCY ANEMIA: ICD-10-CM

## 2023-11-16 DIAGNOSIS — Z00.00 ENCOUNTER FOR ANNUAL HEALTH EXAMINATION: ICD-10-CM

## 2023-11-16 DIAGNOSIS — N18.31 STAGE 3A CHRONIC KIDNEY DISEASE (HCC): ICD-10-CM

## 2023-11-16 PROBLEM — F03.90 DEMENTIA (HCC): Chronic | Status: ACTIVE | Noted: 2023-11-16

## 2023-11-16 RX ORDER — ESCITALOPRAM OXALATE 10 MG/1
10 TABLET ORAL DAILY
Qty: 90 TABLET | Refills: 1 | Status: SHIPPED | OUTPATIENT
Start: 2023-11-16

## 2023-11-16 RX ORDER — DONEPEZIL HYDROCHLORIDE 5 MG/1
5 TABLET, FILM COATED ORAL NIGHTLY
Qty: 90 TABLET | Refills: 1 | Status: SHIPPED | OUTPATIENT
Start: 2023-11-16

## 2023-11-16 RX ORDER — TRIAMCINOLONE ACETONIDE 1 MG/G
CREAM TOPICAL 2 TIMES DAILY PRN
Qty: 60 G | Refills: 1 | Status: SHIPPED | OUTPATIENT
Start: 2023-11-16

## 2023-11-21 ENCOUNTER — LAB ENCOUNTER (OUTPATIENT)
Dept: LAB | Age: 83
End: 2023-11-21
Attending: INTERNAL MEDICINE
Payer: MEDICARE

## 2023-11-21 DIAGNOSIS — E78.5 DYSLIPIDEMIA: ICD-10-CM

## 2023-11-21 DIAGNOSIS — G31.84 MILD COGNITIVE IMPAIRMENT WITH MEMORY LOSS: ICD-10-CM

## 2023-11-21 DIAGNOSIS — Z13.29 SCREENING FOR THYROID DISORDER: ICD-10-CM

## 2023-11-21 DIAGNOSIS — N18.31 STAGE 3A CHRONIC KIDNEY DISEASE (HCC): ICD-10-CM

## 2023-11-21 DIAGNOSIS — Z13.0 SCREENING FOR DEFICIENCY ANEMIA: ICD-10-CM

## 2023-11-21 DIAGNOSIS — E55.9 VITAMIN D DEFICIENCY: ICD-10-CM

## 2023-11-21 DIAGNOSIS — Z00.00 ANNUAL PHYSICAL EXAM: ICD-10-CM

## 2023-11-21 LAB
ALBUMIN SERPL-MCNC: 4.2 G/DL (ref 3.2–4.8)
ALBUMIN/GLOB SERPL: 1.7 {RATIO} (ref 1–2)
ALP LIVER SERPL-CCNC: 80 U/L
ALT SERPL-CCNC: 11 U/L
ANION GAP SERPL CALC-SCNC: 5 MMOL/L (ref 0–18)
AST SERPL-CCNC: 18 U/L (ref ?–34)
BASOPHILS # BLD AUTO: 0.03 X10(3) UL (ref 0–0.2)
BASOPHILS NFR BLD AUTO: 0.7 %
BILIRUB SERPL-MCNC: 0.6 MG/DL (ref 0.2–1.1)
BUN BLD-MCNC: 18 MG/DL (ref 9–23)
BUN/CREAT SERPL: 19.1 (ref 10–20)
CALCIUM BLD-MCNC: 9.8 MG/DL (ref 8.7–10.4)
CHLORIDE SERPL-SCNC: 107 MMOL/L (ref 98–112)
CHOLEST SERPL-MCNC: 295 MG/DL (ref ?–200)
CO2 SERPL-SCNC: 28 MMOL/L (ref 21–32)
CREAT BLD-MCNC: 0.94 MG/DL
DEPRECATED RDW RBC AUTO: 42.6 FL (ref 35.1–46.3)
EGFRCR SERPLBLD CKD-EPI 2021: 61 ML/MIN/1.73M2 (ref 60–?)
EOSINOPHIL # BLD AUTO: 0.04 X10(3) UL (ref 0–0.7)
EOSINOPHIL NFR BLD AUTO: 0.9 %
ERYTHROCYTE [DISTWIDTH] IN BLOOD BY AUTOMATED COUNT: 15.8 % (ref 11–15)
FASTING PATIENT LIPID ANSWER: YES
FASTING STATUS PATIENT QL REPORTED: YES
GLOBULIN PLAS-MCNC: 2.5 G/DL (ref 2.8–4.4)
GLUCOSE BLD-MCNC: 90 MG/DL (ref 70–99)
HCT VFR BLD AUTO: 38.6 %
HDLC SERPL-MCNC: 93 MG/DL (ref 40–59)
HGB BLD-MCNC: 12.3 G/DL
IMM GRANULOCYTES # BLD AUTO: 0.01 X10(3) UL (ref 0–1)
IMM GRANULOCYTES NFR BLD: 0.2 %
LDLC SERPL CALC-MCNC: 192 MG/DL (ref ?–100)
LYMPHOCYTES # BLD AUTO: 2.07 X10(3) UL (ref 1–4)
LYMPHOCYTES NFR BLD AUTO: 45.7 %
MCH RBC QN AUTO: 23.8 PG (ref 26–34)
MCHC RBC AUTO-ENTMCNC: 31.9 G/DL (ref 31–37)
MCV RBC AUTO: 74.8 FL
MONOCYTES # BLD AUTO: 0.51 X10(3) UL (ref 0.1–1)
MONOCYTES NFR BLD AUTO: 11.3 %
NEUTROPHILS # BLD AUTO: 1.87 X10 (3) UL (ref 1.5–7.7)
NEUTROPHILS # BLD AUTO: 1.87 X10(3) UL (ref 1.5–7.7)
NEUTROPHILS NFR BLD AUTO: 41.2 %
NONHDLC SERPL-MCNC: 202 MG/DL (ref ?–130)
OSMOLALITY SERPL CALC.SUM OF ELEC: 291 MOSM/KG (ref 275–295)
PLATELET # BLD AUTO: 176 10(3)UL (ref 150–450)
POTASSIUM SERPL-SCNC: 4.5 MMOL/L (ref 3.5–5.1)
PROT SERPL-MCNC: 6.7 G/DL (ref 5.7–8.2)
RBC # BLD AUTO: 5.16 X10(6)UL
SODIUM SERPL-SCNC: 140 MMOL/L (ref 136–145)
TRIGL SERPL-MCNC: 66 MG/DL (ref 30–149)
TSI SER-ACNC: 2.6 MIU/ML (ref 0.55–4.78)
VIT B12 SERPL-MCNC: 404 PG/ML (ref 211–911)
VIT D+METAB SERPL-MCNC: 16.8 NG/ML (ref 30–100)
VLDLC SERPL CALC-MCNC: 14 MG/DL (ref 0–30)
WBC # BLD AUTO: 4.5 X10(3) UL (ref 4–11)

## 2023-11-21 PROCEDURE — 85025 COMPLETE CBC W/AUTO DIFF WBC: CPT

## 2023-11-21 PROCEDURE — 80053 COMPREHEN METABOLIC PANEL: CPT

## 2023-11-21 PROCEDURE — 80061 LIPID PANEL: CPT

## 2023-11-21 PROCEDURE — 84443 ASSAY THYROID STIM HORMONE: CPT

## 2023-11-21 PROCEDURE — 82607 VITAMIN B-12: CPT

## 2023-11-21 PROCEDURE — 36415 COLL VENOUS BLD VENIPUNCTURE: CPT

## 2023-11-21 PROCEDURE — 82306 VITAMIN D 25 HYDROXY: CPT

## 2023-11-28 ENCOUNTER — TELEPHONE (OUTPATIENT)
Dept: INTERNAL MEDICINE CLINIC | Facility: CLINIC | Age: 83
End: 2023-11-28

## 2023-11-28 RX ORDER — ERGOCALCIFEROL 1.25 MG/1
50000 CAPSULE ORAL WEEKLY
Qty: 8 CAPSULE | Refills: 0 | Status: SHIPPED | OUTPATIENT
Start: 2023-11-28 | End: 2024-02-20

## 2023-11-28 NOTE — TELEPHONE ENCOUNTER
Called patient's daughter regarding low vitamin D 16.8. Recommend once a week supplementation    Cholesterol levels are on the high side for which statin is recommended. However, recall she has had myalgias do this in the past and I have updated her allergy list.    LVM for Von (patient has dementia)  -Needs to resume her red yeast rice, recall statin intolerance due to myalgias.   Added to allergy list  - Consider use of ezetimibe 10 mg once a day  - Vitamin D 50,000 units once a week for 8 weeks

## 2023-12-27 ENCOUNTER — TELEPHONE (OUTPATIENT)
Dept: INTERNAL MEDICINE CLINIC | Facility: CLINIC | Age: 83
End: 2023-12-27

## 2023-12-27 NOTE — TELEPHONE ENCOUNTER
Patients girlfriend Fe called stating the patient has been trying to get a refill for his Albuterol and his pharmacy has been sending request for this for a few days. Please send over to patients pharmacy.    Received a message from the answering services stating the patient has received medication from another physician but her symptoms are getting worse. No other information shared.

## 2023-12-27 NOTE — TELEPHONE ENCOUNTER
Called and spoke to patients daughter. Daughter feels her mothers dementia has progressed in the last month would like to get her in to see Dr. Jaja Mooney for maybe some med to calm her down and help with sleep.

## 2024-05-14 ENCOUNTER — TELEPHONE (OUTPATIENT)
Dept: INTERNAL MEDICINE CLINIC | Facility: CLINIC | Age: 84
End: 2024-05-14

## 2024-05-14 ENCOUNTER — PATIENT MESSAGE (OUTPATIENT)
Dept: INTERNAL MEDICINE CLINIC | Facility: CLINIC | Age: 84
End: 2024-05-14

## 2024-05-14 NOTE — TELEPHONE ENCOUNTER
To DR. SÁNCHEZ:  [facility]    From: Nancy Velazquez  To: Jeff Anthony D'Amico  Sent: 5/14/2024  1:43 PM CDT  Subject: Follow-up to 5/13/2024 Visit    As promised I am providing the natural supplements that we would like Nancy Velazquez ( mom) to start taking daily and wanted to make sure this would not cause problems with the medications she is currently taking. One of the supplements listed below, will need a doctors prescription, thus we would not only need your approval, but also a prescription.    Here is the list:    (NAC) N acetyl cysteine- 600 mg (Source Naturals brand)    (Magtein) Magnesium L- Threonate-   (Source Naturals brand) & Magnesium Glycinate (Source Naturals brand)    Bromelain 500 mg- (Vitamin Shoppe)    Vitamin C 1000 mg    CBN- Trulieve brand (Prescription needed)    Mom still is generally having difficulty connecting thoughts, exhibits general confusion, which causes daily frustration and depression. Daily crying spells are not unusual. It's as though she has floods of thoughts and has trouble grasping and holding on to any one thought. The above regiment of supplements have helped some including families we know personally and we would like to see if it helps mom. Please reply as soon as possible. We will not be able to get the  CBN- Trulieve brand without a prescription. If you have any questions,  don't hesitate to let me know. [phone]    On our next visit, we will update you on the results of the Memantine you prescribed during yesterdays visit.    As always, Thank you for your attentiveness and the care your entire staff demonstrates for our mother.    regards,  Claudette Velazquez III

## 2024-05-14 NOTE — TELEPHONE ENCOUNTER
From: Nancy ANOOP Velazquez  To: Jeff Anthony D'Amico  Sent: 5/14/2024 1:43 PM CDT  Subject: Follow-up to 5/13/2024 Visit    As promised I am providing the natural supplements that we would like Nancy Velazquez ( mom) to start taking daily and wanted to make sure this would not cause problems with the medications she is currently taking. One of the supplements listed below, will need a doctors prescription, thus we would not only need your approval, but also a prescription.     Here is the list:    (NAC) N acetyl cysteine- 600 mg (Source Naturals brand)    (Magtein) Magnesium L- Threonate-   (Source Naturals brand) & Magnesium Glycinate (Source Naturals brand)    Bromelain 500 mg- (Vitamin Shoppe)    Vitamin C 1000 mg     CBN- Trulieve brand (Prescription needed)    Mom still is generally having difficulty connecting thoughts, exhibits general confusion, which causes daily frustration and depression. Daily crying spells are not unusual. It's as though she has floods of thoughts and has trouble grasping and holding on to any one thought. The above regiment of supplements have helped some including families we know personally and we would like to see if it helps mom. Please reply as soon as possible. We will not be able to get the CBN- Trulieve brand without a prescription. If you have any questions, don't hesitate to let me know. [phone]    On our next visit, we will update you on the results of the Memantine you prescribed during yesterdays visit.    As always, Thank you for your attentiveness and the care your entire staff demonstrates for our mother.    regards,  Claudette Velazquez III

## 2024-05-14 NOTE — TELEPHONE ENCOUNTER
Noted, I would have to defer to the primary physician for prescription of CBD nature here, to aura Hodge, they are describing a strange supplement they had wanted to try and needed a prescription for, I did think it is appropriate for me to write that yesterday as I started some Namenda on her, and recommended they see a specialist.  See his message below.

## 2024-07-03 ENCOUNTER — PATIENT MESSAGE (OUTPATIENT)
Dept: INTERNAL MEDICINE CLINIC | Facility: CLINIC | Age: 84
End: 2024-07-03

## 2024-07-03 NOTE — TELEPHONE ENCOUNTER
From: Nancy Velazquez  To: Ayesha Fisher  Sent: 7/3/2024 10:17 AM CDT  Subject: Information for Memory Care Program    Dear Dr. Fisher,    First my siblings and I would like to thank you for the kind and attentive care you always provide our mother.     We are making arrangements to add my mother to the [facility] waiting list. They provide a memory care program that Mom will need to eventually take advantage of.   In order to be added to the waiting list, [facility] needs history/physical information with progress notes from you from her recent visit and a current medication list.   As soon as possible, Please email or fax this information to the Admissions Director Leah Hinojosa.   (cell: [phone];   fax: [phone]; email:[email or web address])    Please let me know when the information has been sent.     Thank you in advance for your prompt handling of this request.    Regards,  Claudette Velazquez III

## 2024-07-03 NOTE — TELEPHONE ENCOUNTER
Office visit note from 6/17/24 and updated medication list faxed to Leah Hinojosa at [facility] at # [phone]. Confirmation received.

## 2024-08-30 ENCOUNTER — LAB ENCOUNTER (OUTPATIENT)
Dept: LAB | Age: 84
End: 2024-08-30
Attending: INTERNAL MEDICINE
Payer: MEDICARE

## 2024-08-30 ENCOUNTER — OFFICE VISIT (OUTPATIENT)
Dept: INTERNAL MEDICINE CLINIC | Facility: CLINIC | Age: 84
End: 2024-08-30

## 2024-08-30 VITALS
DIASTOLIC BLOOD PRESSURE: 68 MMHG | TEMPERATURE: 99 F | HEIGHT: 62 IN | WEIGHT: 125 LBS | BODY MASS INDEX: 23 KG/M2 | HEART RATE: 56 BPM | SYSTOLIC BLOOD PRESSURE: 146 MMHG

## 2024-08-30 DIAGNOSIS — E55.9 VITAMIN D DEFICIENCY: ICD-10-CM

## 2024-08-30 DIAGNOSIS — I10 ESSENTIAL HYPERTENSION: ICD-10-CM

## 2024-08-30 DIAGNOSIS — F41.9 ANXIETY: ICD-10-CM

## 2024-08-30 DIAGNOSIS — N18.31 STAGE 3A CHRONIC KIDNEY DISEASE (HCC): ICD-10-CM

## 2024-08-30 DIAGNOSIS — G30.8 MODERATE ALZHEIMER'S DEMENTIA OF OTHER ONSET WITH MOOD DISTURBANCE (HCC): ICD-10-CM

## 2024-08-30 DIAGNOSIS — E78.5 DYSLIPIDEMIA: ICD-10-CM

## 2024-08-30 DIAGNOSIS — Z23 NEED FOR VACCINATION: Primary | ICD-10-CM

## 2024-08-30 DIAGNOSIS — F02.B3 MODERATE ALZHEIMER'S DEMENTIA OF OTHER ONSET WITH MOOD DISTURBANCE (HCC): ICD-10-CM

## 2024-08-30 DIAGNOSIS — Z13.0 SCREENING FOR DEFICIENCY ANEMIA: ICD-10-CM

## 2024-08-30 LAB
ALBUMIN SERPL-MCNC: 4.3 G/DL (ref 3.2–4.8)
ALBUMIN/GLOB SERPL: 1.7 {RATIO} (ref 1–2)
ALP LIVER SERPL-CCNC: 85 U/L
ALT SERPL-CCNC: 14 U/L
ANION GAP SERPL CALC-SCNC: 7 MMOL/L (ref 0–18)
AST SERPL-CCNC: 19 U/L (ref ?–34)
BASOPHILS # BLD AUTO: 0.03 X10(3) UL (ref 0–0.2)
BASOPHILS NFR BLD AUTO: 0.6 %
BILIRUB SERPL-MCNC: 0.6 MG/DL (ref 0.2–1.1)
BUN BLD-MCNC: 19 MG/DL (ref 9–23)
BUN/CREAT SERPL: 20.4 (ref 10–20)
CALCIUM BLD-MCNC: 9.6 MG/DL (ref 8.7–10.4)
CHLORIDE SERPL-SCNC: 109 MMOL/L (ref 98–112)
CHOLEST SERPL-MCNC: 283 MG/DL (ref ?–200)
CO2 SERPL-SCNC: 29 MMOL/L (ref 21–32)
CREAT BLD-MCNC: 0.93 MG/DL
DEPRECATED HBV CORE AB SER IA-ACNC: 395.7 NG/ML
DEPRECATED RDW RBC AUTO: 44.1 FL (ref 35.1–46.3)
EGFRCR SERPLBLD CKD-EPI 2021: 61 ML/MIN/1.73M2 (ref 60–?)
EOSINOPHIL # BLD AUTO: 0.16 X10(3) UL (ref 0–0.7)
EOSINOPHIL NFR BLD AUTO: 3 %
ERYTHROCYTE [DISTWIDTH] IN BLOOD BY AUTOMATED COUNT: 16.1 % (ref 11–15)
FASTING PATIENT LIPID ANSWER: NO
FASTING STATUS PATIENT QL REPORTED: NO
GLOBULIN PLAS-MCNC: 2.6 G/DL (ref 2–3.5)
GLUCOSE BLD-MCNC: 87 MG/DL (ref 70–99)
HCT VFR BLD AUTO: 40 %
HDLC SERPL-MCNC: 91 MG/DL (ref 40–59)
HGB BLD-MCNC: 12.5 G/DL
IMM GRANULOCYTES # BLD AUTO: 0.02 X10(3) UL (ref 0–1)
IMM GRANULOCYTES NFR BLD: 0.4 %
IRON SATN MFR SERPL: 20 %
IRON SERPL-MCNC: 61 UG/DL
LDLC SERPL CALC-MCNC: 181 MG/DL (ref ?–100)
LYMPHOCYTES # BLD AUTO: 2 X10(3) UL (ref 1–4)
LYMPHOCYTES NFR BLD AUTO: 37.5 %
MCH RBC QN AUTO: 23.8 PG (ref 26–34)
MCHC RBC AUTO-ENTMCNC: 31.3 G/DL (ref 31–37)
MCV RBC AUTO: 76.2 FL
MONOCYTES # BLD AUTO: 0.67 X10(3) UL (ref 0.1–1)
MONOCYTES NFR BLD AUTO: 12.6 %
NEUTROPHILS # BLD AUTO: 2.45 X10 (3) UL (ref 1.5–7.7)
NEUTROPHILS # BLD AUTO: 2.45 X10(3) UL (ref 1.5–7.7)
NEUTROPHILS NFR BLD AUTO: 45.9 %
NONHDLC SERPL-MCNC: 192 MG/DL (ref ?–130)
OSMOLALITY SERPL CALC.SUM OF ELEC: 302 MOSM/KG (ref 275–295)
PLATELET # BLD AUTO: 186 10(3)UL (ref 150–450)
PLATELETS.RETICULATED NFR BLD AUTO: 15.5 % (ref 0–7)
POTASSIUM SERPL-SCNC: 4.2 MMOL/L (ref 3.5–5.1)
PROT SERPL-MCNC: 6.9 G/DL (ref 5.7–8.2)
RBC # BLD AUTO: 5.25 X10(6)UL
SODIUM SERPL-SCNC: 145 MMOL/L (ref 136–145)
TIBC SERPL-MCNC: 301 UG/DL (ref 250–425)
TRANSFERRIN SERPL-MCNC: 202 MG/DL (ref 250–380)
TRIGL SERPL-MCNC: 69 MG/DL (ref 30–149)
TSI SER-ACNC: 2.18 MIU/ML (ref 0.55–4.78)
VIT B12 SERPL-MCNC: 469 PG/ML (ref 211–911)
VIT D+METAB SERPL-MCNC: 24.1 NG/ML (ref 30–100)
VLDLC SERPL CALC-MCNC: 14 MG/DL (ref 0–30)
WBC # BLD AUTO: 5.3 X10(3) UL (ref 4–11)

## 2024-08-30 PROCEDURE — 82607 VITAMIN B-12: CPT

## 2024-08-30 PROCEDURE — 80061 LIPID PANEL: CPT

## 2024-08-30 PROCEDURE — 1159F MED LIST DOCD IN RCRD: CPT | Performed by: INTERNAL MEDICINE

## 2024-08-30 PROCEDURE — 80053 COMPREHEN METABOLIC PANEL: CPT

## 2024-08-30 PROCEDURE — 90677 PCV20 VACCINE IM: CPT | Performed by: INTERNAL MEDICINE

## 2024-08-30 PROCEDURE — 83540 ASSAY OF IRON: CPT

## 2024-08-30 PROCEDURE — 84443 ASSAY THYROID STIM HORMONE: CPT

## 2024-08-30 PROCEDURE — 82728 ASSAY OF FERRITIN: CPT

## 2024-08-30 PROCEDURE — 3077F SYST BP >= 140 MM HG: CPT | Performed by: INTERNAL MEDICINE

## 2024-08-30 PROCEDURE — 84466 ASSAY OF TRANSFERRIN: CPT

## 2024-08-30 PROCEDURE — 1126F AMNT PAIN NOTED NONE PRSNT: CPT | Performed by: INTERNAL MEDICINE

## 2024-08-30 PROCEDURE — 3008F BODY MASS INDEX DOCD: CPT | Performed by: INTERNAL MEDICINE

## 2024-08-30 PROCEDURE — 99214 OFFICE O/P EST MOD 30 MIN: CPT | Performed by: INTERNAL MEDICINE

## 2024-08-30 PROCEDURE — 36415 COLL VENOUS BLD VENIPUNCTURE: CPT

## 2024-08-30 PROCEDURE — G0009 ADMIN PNEUMOCOCCAL VACCINE: HCPCS | Performed by: INTERNAL MEDICINE

## 2024-08-30 PROCEDURE — 85025 COMPLETE CBC W/AUTO DIFF WBC: CPT

## 2024-08-30 PROCEDURE — 3078F DIAST BP <80 MM HG: CPT | Performed by: INTERNAL MEDICINE

## 2024-08-30 PROCEDURE — 82306 VITAMIN D 25 HYDROXY: CPT

## 2024-08-30 RX ORDER — DONEPEZIL HYDROCHLORIDE 5 MG/1
5 TABLET, FILM COATED ORAL NIGHTLY
Qty: 90 TABLET | Refills: 0 | OUTPATIENT
Start: 2024-08-30

## 2024-08-30 RX ORDER — DONEPEZIL HYDROCHLORIDE 5 MG/1
5 TABLET, FILM COATED ORAL NIGHTLY
Qty: 30 TABLET | Refills: 1 | Status: SHIPPED | OUTPATIENT
Start: 2024-08-30

## 2024-08-30 RX ORDER — ESCITALOPRAM OXALATE 5 MG/1
5 TABLET ORAL DAILY
Qty: 30 TABLET | Refills: 0 | Status: SHIPPED | OUTPATIENT
Start: 2024-08-30

## 2024-08-30 RX ORDER — MEMANTINE HYDROCHLORIDE 5 MG/1
5 TABLET ORAL 2 TIMES DAILY
Qty: 30 TABLET | Refills: 1 | Status: SHIPPED | OUTPATIENT
Start: 2024-08-30

## 2024-08-30 NOTE — PROGRESS NOTES
Nancy Velazquez is a 83 year old female.  Chief Complaint   Patient presents with    Checkup     Discuss med changes         HPI:   Nancy Velazquez is a 83 year old female who presents for: follow up    Patient was recently taken off anxiety and memory medications. Dtr notes that patient seems worse after this change. She is more forgetful which leads to more anxiety  Would like to get back on medications        Wt Readings from Last 6 Encounters:   08/30/24 125 lb (56.7 kg)   06/17/24 127 lb 3.2 oz (57.7 kg)   05/13/24 123 lb 4 oz (55.9 kg)   02/08/24 125 lb (56.7 kg)   01/03/24 120 lb (54.4 kg)   11/16/23 120 lb (54.4 kg)     Body mass index is 22.86 kg/m².       Current Outpatient Medications   Medication Sig Dispense Refill    donepezil 5 MG Oral Tab Take 1 tablet (5 mg total) by mouth nightly. 30 tablet 1    memantine 5 MG Oral Tab Take 1 tablet (5 mg total) by mouth 2 (two) times daily. 30 tablet 1    escitalopram 5 MG Oral Tab Take 1 tablet (5 mg total) by mouth daily. 30 tablet 0    Cholecalciferol 125 MCG (5000 UT) Oral Tab Take 1 tablet (5,000 Units total) by mouth daily.      amLODIPine 5 MG Oral Tab Take 1 tablet (5 mg total) by mouth daily. 90 tablet 3    Red Yeast Rice 600 MG Oral Tab Take by mouth.      diphenhydrAMINE HCl, Sleep, (UNISOM SLEEPGELS) 50 MG Oral Cap Take by mouth nightly as needed.      Coenzyme Q10 (CO Q 10 OR) Take 1 tablet by mouth daily.      POTASSIUM GLUCONATE OR Take 1 tablet by mouth daily.      naproxen 220 MG Oral Tab Take 1 tablet (220 mg total) by mouth nightly as needed.      Calcium 600 MG Oral Tab Take 1 tablet daily 1 tablet 0    magnesium 250 MG Oral Tab Take 1 tablet (250 mg total) by mouth daily. (Patient not taking: Reported on 8/30/2024)        Past Medical History:    Essential hypertension    Fall    around 8/8/23    Lymph edema      No past surgical history on file.   Family History   Problem Relation Age of Onset    Diabetes Mother     Dementia Mother     No  Known Problems Sister     No Known Problems Brother       Social History:   Social History     Socioeconomic History    Marital status: Single   Tobacco Use    Smoking status: Never    Smokeless tobacco: Never   Vaping Use    Vaping status: Never Used   Substance and Sexual Activity    Alcohol use: Not Currently    Drug use: Never          REVIEW OF SYSTEMS:   GENERAL: feels well otherwise  SKIN: denies any unusual skin lesions  HEENT: denies nasal congestion, sinus pain, ST, sore throat, ear pain  LUNGS: denies shortness of breath with exertion, wheezing, cough, or sputum production  CARDIOVASCULAR: denies chest pain, pressure, or palpitations      EXAM:   /68   Pulse 56   Temp 98.7 °F (37.1 °C) (Oral)   Ht 5' 2\" (1.575 m)   Wt 125 lb (56.7 kg)   BMI 22.86 kg/m²     GENERAL: well developed, well nourished, in no apparent distress  HEENT: normal oropharynx without erythema or exudate, normal TM's, no sinus tenderness, nares patent  EYES: PERRLA, EOMI, conjunctivae are pink  NECK: supple, no carotic bruits, no thyromegaly, no cervical or supraclavicular LAD  LUNGS: clear to auscultation bilaterally, no wheezing or rhonchi  CARDIO: RRR, normal S1S2, no gallops or murmurs      ASSESSMENT AND PLAN:     1. Need for vaccination  Given prevnar today; rec flu/covid vaccines in the next few weeks  - Prevnar 20 (PCV20) [32262]    2. Essential hypertension  Check blood pressures at home and mychart me readings  - Comp Metabolic Panel (14); Future    3. Dyslipidemia  Check labs    4. Anxiety  Check labs  Start lexapro 5mg daily; will follow up in 1 month and consider increasing dose based on response  - CBC With Differential With Platelet; Future  - TSH W Reflex To Free T4; Future  - escitalopram 5 MG Oral Tab; Take 1 tablet (5 mg total) by mouth daily.  Dispense: 30 tablet; Refill: 0    5. Stage 3a chronic kidney disease (HCC)  Check labs  - Comp Metabolic Panel (14); Future    6. Vitamin D deficiency  Check labs  -  Vitamin D; Future    7. Screening for deficiency anemia  Check labs  - Vitamin B12 [E]; Future  - Iron And Tibc [E]; Future  - Ferritin [E]; Future    8. 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  Dispense: 30 tablet; Refill: 1  - memantine 5 MG Oral Tab; Take 1 tablet (5 mg total) by mouth 2 (two) times daily.  Dispense: 30 tablet; Refill: 1      Ayesha Fisher DO  8/30/2024  1:12 PM

## 2024-08-30 NOTE — TELEPHONE ENCOUNTER
Current refill request refused due to refill is either a duplicate request or has active refills at the pharmacy.  Check previous templates.    Requested Prescriptions     Refused Prescriptions Disp Refills    DONEPEZIL 5 MG Oral Tab [Pharmacy Med Name: DONEPEZIL 5MG TABLETS] 90 tablet 0     Sig: TAKE 1 TABLET(5 MG) BY MOUTH EVERY NIGHT     Refused By: AIDEE CARRANZA     Reason for Refusal: Request already responded to by other means (e.g. phone or fax)

## 2024-09-03 ENCOUNTER — TELEPHONE (OUTPATIENT)
Dept: INTERNAL MEDICINE CLINIC | Facility: CLINIC | Age: 84
End: 2024-09-03

## 2024-09-03 NOTE — TELEPHONE ENCOUNTER
Please notify patient's dtr that blood work was overall ok with a few exceptions    1) vitamin D is low, would recommend that she take d3 5000 units daily  2) cholesterol is really quite high; patient has been reluctant to take any medication but would have her take red yeast rice supplement daily

## 2024-09-26 ENCOUNTER — VIRTUAL PHONE E/M (OUTPATIENT)
Dept: INTERNAL MEDICINE CLINIC | Facility: CLINIC | Age: 84
End: 2024-09-26

## 2024-09-26 DIAGNOSIS — F41.9 ANXIETY: ICD-10-CM

## 2024-09-26 DIAGNOSIS — F02.B3 MODERATE ALZHEIMER'S DEMENTIA OF OTHER ONSET WITH MOOD DISTURBANCE (HCC): ICD-10-CM

## 2024-09-26 DIAGNOSIS — G30.8 MODERATE ALZHEIMER'S DEMENTIA OF OTHER ONSET WITH MOOD DISTURBANCE (HCC): ICD-10-CM

## 2024-09-27 RX ORDER — DONEPEZIL HYDROCHLORIDE 5 MG/1
5 TABLET, FILM COATED ORAL NIGHTLY
Qty: 90 TABLET | Refills: 3 | Status: SHIPPED | OUTPATIENT
Start: 2024-09-27

## 2024-09-27 RX ORDER — ESCITALOPRAM OXALATE 5 MG/1
5 TABLET ORAL DAILY
Qty: 90 TABLET | Refills: 3 | Status: SHIPPED | OUTPATIENT
Start: 2024-09-27

## 2024-09-27 RX ORDER — MEMANTINE HYDROCHLORIDE 5 MG/1
5 TABLET ORAL 2 TIMES DAILY
Qty: 180 TABLET | Refills: 3 | Status: SHIPPED | OUTPATIENT
Start: 2024-09-27

## 2024-09-27 NOTE — PROGRESS NOTES
Virtual Telephone Check-In    Nancy Velazquez verbally consents to a Virtual/Telephone Check-In visit on 09/27/24.  Patient has been referred to the [facility] website at [email or web address] to review the yearly Consent to Treat document.    Patient understands and accepts financial responsibility for any deductible, co-insurance and/or co-pays associated with this service.    Duration of the service: 20 minutes      Summary of topics discussed:     Discussed with dtr--they have noted significant improvement in patient's overall mood and behavior.     Reports blood pressures are consistently 140-160s.           1.  Essential hypertension  Resume amlodipine 5mg daily; update me in about 2 weeks with bp readings     2. Anxiety  Continue lexapro 5mg daily     3. 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

## 2024-10-25 ENCOUNTER — TELEPHONE (OUTPATIENT)
Dept: INTERNAL MEDICINE CLINIC | Facility: CLINIC | Age: 84
End: 2024-10-25

## 2024-11-03 NOTE — TELEPHONE ENCOUNTER
To ,    Can we have the patient change to Medicare annual visit for scheduled appointment 6/10/2020. Says she would be due when I see her tomorrow on 6/10.   If patient is amenable, can we change the appointment to a 1 hour slot but we can cert IMPROVE-DD Application Not Available

## 2024-11-20 ENCOUNTER — TELEPHONE (OUTPATIENT)
Dept: INTERNAL MEDICINE CLINIC | Facility: CLINIC | Age: 84
End: 2024-11-20

## 2024-11-20 NOTE — TELEPHONE ENCOUNTER
11-20 MyChart message below from Dr JOSHUA farias to TE    Please call---  We are not prescribing potassium gluconate for her--does not need to take it  In terms for coenzyme c80--dugq is an over the counter supplement, should take 100mg daily  The red yeast rice is also an otc supplement-can family bring it for her?

## 2024-11-20 NOTE — TELEPHONE ENCOUNTER
S/w patient daughter/ Charme and relayed MD message.  Verbalizes understanding and agreement with tx. plan     HIPAA verified

## 2024-12-17 ENCOUNTER — TELEPHONE (OUTPATIENT)
Dept: INTERNAL MEDICINE CLINIC | Facility: CLINIC | Age: 84
End: 2024-12-17

## 2024-12-17 NOTE — TELEPHONE ENCOUNTER
Refill request received for amlodipine. Per 's 9/26/2024 virtual visit note, \"Resume amlodipine 5mg daily; update me in about 2 weeks with bp readings\".    [employer] sent to patient requesting BP update

## 2024-12-19 ENCOUNTER — TELEPHONE (OUTPATIENT)
Dept: INTERNAL MEDICINE CLINIC | Facility: CLINIC | Age: 84
End: 2024-12-19

## 2024-12-19 NOTE — TELEPHONE ENCOUNTER
To Dr LEWIS,    Please advise    I called and  spoke with Tammy who stated she is not allowed to treat patients blackhead w/o an order from you.    Please advise on treatment plan    No fever, redness or other symptoms of infection

## 2024-12-19 NOTE — TELEPHONE ENCOUNTER
Tammy st nurse from [employer] is calling to let the doctor know the patient has a lump on her back.  Nurse states the lump is in the center of the back about 1 inch and has a blackhead in the center.    Please advise

## 2024-12-20 NOTE — TELEPHONE ENCOUNTER
I spoke to Jeannette at [address] and relayed Dr Fisher's message to her. She verbalized understanding.    Patient has appt scheduled for 12/23 to see Dr Fisher

## 2024-12-20 NOTE — TELEPHONE ENCOUNTER
Would recommend warm compresses to the area to see if we can open it up and get it to drain  Can apply topical triple abx ointment as well    Schedule OV if needed to have it drained in office

## 2024-12-23 ENCOUNTER — TELEPHONE (OUTPATIENT)
Dept: INTERNAL MEDICINE CLINIC | Facility: CLINIC | Age: 84
End: 2024-12-23

## 2024-12-23 ENCOUNTER — OFFICE VISIT (OUTPATIENT)
Dept: INTERNAL MEDICINE CLINIC | Facility: CLINIC | Age: 84
End: 2024-12-23

## 2024-12-23 VITALS
TEMPERATURE: 98 F | DIASTOLIC BLOOD PRESSURE: 56 MMHG | BODY MASS INDEX: 24.44 KG/M2 | SYSTOLIC BLOOD PRESSURE: 124 MMHG | HEIGHT: 62 IN | HEART RATE: 54 BPM | OXYGEN SATURATION: 99 % | WEIGHT: 132.81 LBS

## 2024-12-23 DIAGNOSIS — L02.91 ABSCESS: Primary | ICD-10-CM

## 2024-12-23 PROCEDURE — 99214 OFFICE O/P EST MOD 30 MIN: CPT | Performed by: INTERNAL MEDICINE

## 2024-12-23 PROCEDURE — 3074F SYST BP LT 130 MM HG: CPT | Performed by: INTERNAL MEDICINE

## 2024-12-23 PROCEDURE — 3078F DIAST BP <80 MM HG: CPT | Performed by: INTERNAL MEDICINE

## 2024-12-23 PROCEDURE — 3008F BODY MASS INDEX DOCD: CPT | Performed by: INTERNAL MEDICINE

## 2024-12-23 PROCEDURE — 1160F RVW MEDS BY RX/DR IN RCRD: CPT | Performed by: INTERNAL MEDICINE

## 2024-12-23 PROCEDURE — 1159F MED LIST DOCD IN RCRD: CPT | Performed by: INTERNAL MEDICINE

## 2024-12-23 PROCEDURE — 90662 IIV NO PRSV INCREASED AG IM: CPT | Performed by: INTERNAL MEDICINE

## 2024-12-23 PROCEDURE — G0008 ADMIN INFLUENZA VIRUS VAC: HCPCS | Performed by: INTERNAL MEDICINE

## 2024-12-23 PROCEDURE — 1126F AMNT PAIN NOTED NONE PRSNT: CPT | Performed by: INTERNAL MEDICINE

## 2024-12-23 RX ORDER — DOXYCYCLINE 100 MG/1
100 CAPSULE ORAL 2 TIMES DAILY
Qty: 14 CAPSULE | Refills: 0 | Status: SHIPPED
Start: 2024-12-23

## 2024-12-23 NOTE — TELEPHONE ENCOUNTER
Franca FERRELL, from [facility] where patient resides, [phone]  ext [phone]  Franca wants to know what procedure was done at appointment today so that it can be included in her progress notes.  Please advise.

## 2024-12-23 NOTE — TELEPHONE ENCOUNTER
Pt had I&D of abscess today; she has a 1.5cm incision; drained purulent material, we sent off cultures. The wound was packed and covered with tegaderm which should not be changed. The patient needs to come on Thursday and I will remove the dressing and packing at that time.   She should start doxycycline twice daily for 1 week (the prescription was faxed to you)

## 2024-12-23 NOTE — TELEPHONE ENCOUNTER
I called and spoke with Franca @ [facility] T# [phone] and relayed Dr LEWIS message.    She is aware to not change the dressing    Informed her of next  office visit date 12-26 at 11 am

## 2024-12-23 NOTE — PROGRESS NOTES
Nancy Velazquez is a 84 year old female.  Chief Complaint   Patient presents with    Bump     Bump to right side back, fell a week ago and did not hit head       HPI:   Nancy Velazquez is a 84 year old female who presents for: bump on right back    Not sure how long it has been there but has been worse lately  Reports falling recently thinks it got worse  No drainage    Wt Readings from Last 6 Encounters:   12/23/24 132 lb 12.8 oz (60.2 kg)   08/30/24 125 lb (56.7 kg)   06/17/24 127 lb 3.2 oz (57.7 kg)   05/13/24 123 lb 4 oz (55.9 kg)   02/08/24 125 lb (56.7 kg)   01/03/24 120 lb (54.4 kg)     Body mass index is 24.29 kg/m².       Current Outpatient Medications   Medication Sig Dispense Refill    escitalopram 5 MG Oral Tab Take 1 tablet (5 mg total) by mouth daily. 90 tablet 3    donepezil 5 MG Oral Tab Take 1 tablet (5 mg total) by mouth nightly. 90 tablet 3    memantine 5 MG Oral Tab Take 1 tablet (5 mg total) by mouth 2 (two) times daily. 180 tablet 3    Cholecalciferol 125 MCG (5000 UT) Oral Tab Take 1 tablet (5,000 Units total) by mouth daily.      amLODIPine 5 MG Oral Tab Take 1 tablet (5 mg total) by mouth daily. 90 tablet 3    Red Yeast Rice 600 MG Oral Tab Take by mouth.      diphenhydrAMINE HCl, Sleep, (UNISOM SLEEPGELS) 50 MG Oral Cap Take by mouth nightly as needed.      Coenzyme Q10 (CO Q 10 OR) Take 1 tablet by mouth daily.      POTASSIUM GLUCONATE OR Take 1 tablet by mouth daily.      naproxen 220 MG Oral Tab Take 1 tablet (220 mg total) by mouth nightly as needed.      Calcium 600 MG Oral Tab Take 1 tablet daily 1 tablet 0    magnesium 250 MG Oral Tab Take 1 tablet (250 mg total) by mouth daily.        Past Medical History:    Essential hypertension    Fall    around 8/8/23    Lymph edema      History reviewed. No pertinent surgical history.   Family History   Problem Relation Age of Onset    Diabetes Mother     Dementia Mother     No Known Problems Sister     No Known Problems Brother       Social  History:   Social History     Socioeconomic History    Marital status: Single   Tobacco Use    Smoking status: Never    Smokeless tobacco: Never   Vaping Use    Vaping status: Never Used   Substance and Sexual Activity    Alcohol use: Not Currently    Drug use: Never          REVIEW OF SYSTEMS:   GENERAL: feels well otherwise  SKIN: +skin lesion, pain; no drainage  No f/c      EXAM:   /56   Pulse 54   Temp 98 °F (36.7 °C)   Ht 5' 2\" (1.575 m)   Wt 132 lb 12.8 oz (60.2 kg)   SpO2 99%   BMI 24.29 kg/m²     GENERAL: well developed, well nourished, in no apparent distress  L mid back infected sebaceous cyst      ASSESSMENT AND PLAN:     Infected sebaceous cyst  Consent for I&D was obtained via telephone by daughter as patient has dementia. Verified with RN.   Area was cleaned x 3 with iodine. 3ml Xylocaine with epinephrine was injected into the area. A 1.5cm incision was made using a #10 scalpel. Immediately there was thick white drainage, with some white chunks. Culture swab was performed. Using forceps area was debrided. An additional 2 ml of xylocaine with epinephrine was injected for additional local pain control. The area was then again cleaned with sterile saline, and packed with 1/2\" iodoform. This was then covered with tegaderm. Patient tolerated procedure well. EBL <5ml.     Pt will return for removal of packing. We will follow up cultures. Patient prescribed doxycycline bid x 7 days.     She is to call with any worsening symptoms.       Ayesha Fisher DO  12/23/2024  12:03 PM

## 2024-12-26 ENCOUNTER — OFFICE VISIT (OUTPATIENT)
Dept: INTERNAL MEDICINE CLINIC | Facility: CLINIC | Age: 84
End: 2024-12-26
Payer: COMMERCIAL

## 2024-12-26 VITALS
WEIGHT: 132 LBS | RESPIRATION RATE: 18 BRPM | HEART RATE: 64 BPM | SYSTOLIC BLOOD PRESSURE: 128 MMHG | DIASTOLIC BLOOD PRESSURE: 56 MMHG | BODY MASS INDEX: 24.29 KG/M2 | OXYGEN SATURATION: 98 % | HEIGHT: 62 IN

## 2024-12-26 DIAGNOSIS — L02.91 ABSCESS: Primary | ICD-10-CM

## 2024-12-26 PROCEDURE — 1125F AMNT PAIN NOTED PAIN PRSNT: CPT | Performed by: INTERNAL MEDICINE

## 2024-12-26 PROCEDURE — 3074F SYST BP LT 130 MM HG: CPT | Performed by: INTERNAL MEDICINE

## 2024-12-26 PROCEDURE — 1170F FXNL STATUS ASSESSED: CPT | Performed by: INTERNAL MEDICINE

## 2024-12-26 PROCEDURE — 3078F DIAST BP <80 MM HG: CPT | Performed by: INTERNAL MEDICINE

## 2024-12-26 PROCEDURE — 3008F BODY MASS INDEX DOCD: CPT | Performed by: INTERNAL MEDICINE

## 2024-12-26 PROCEDURE — 99213 OFFICE O/P EST LOW 20 MIN: CPT | Performed by: INTERNAL MEDICINE

## 2024-12-26 NOTE — PROGRESS NOTES
Nancy Velazquez is a 84 year old female.  Chief Complaint   Patient presents with    Follow - Up     Pt. Presents for a follow up on bump pain on right side of back and congestion. Pt. States she was not able to sleep with an extra pillow and did not try to do hot shower or steamed showers, unable to do either or. Has pain on bump with movement. Rating pain 4/10.       HPI:   Nancy Velazquez is a 84 year old female who presents for: follow up on abscess  Having some pain        Wt Readings from Last 6 Encounters:   12/26/24 132 lb (59.9 kg)   12/23/24 132 lb 12.8 oz (60.2 kg)   08/30/24 125 lb (56.7 kg)   06/17/24 127 lb 3.2 oz (57.7 kg)   05/13/24 123 lb 4 oz (55.9 kg)   02/08/24 125 lb (56.7 kg)     Body mass index is 24.14 kg/m².       Current Outpatient Medications   Medication Sig Dispense Refill    doxycycline 100 MG Oral Cap Take 1 capsule (100 mg total) by mouth 2 (two) times daily. 14 capsule 0    escitalopram 5 MG Oral Tab Take 1 tablet (5 mg total) by mouth daily. 90 tablet 3    donepezil 5 MG Oral Tab Take 1 tablet (5 mg total) by mouth nightly. 90 tablet 3    memantine 5 MG Oral Tab Take 1 tablet (5 mg total) by mouth 2 (two) times daily. 180 tablet 3    Cholecalciferol 125 MCG (5000 UT) Oral Tab Take 1 tablet (5,000 Units total) by mouth daily.      amLODIPine 5 MG Oral Tab Take 1 tablet (5 mg total) by mouth daily. 90 tablet 3    Red Yeast Rice 600 MG Oral Tab Take by mouth.      diphenhydrAMINE HCl, Sleep, (UNISOM SLEEPGELS) 50 MG Oral Cap Take by mouth nightly as needed.      Coenzyme Q10 (CO Q 10 OR) Take 1 tablet by mouth daily.      POTASSIUM GLUCONATE OR Take 1 tablet by mouth daily.      naproxen 220 MG Oral Tab Take 1 tablet (220 mg total) by mouth nightly as needed.      Calcium 600 MG Oral Tab Take 1 tablet daily 1 tablet 0    magnesium 250 MG Oral Tab Take 1 tablet (250 mg total) by mouth daily.        Past Medical History:    Essential hypertension    Fall    around 8/8/23    Lymph edema       History reviewed. No pertinent surgical history.   Family History   Problem Relation Age of Onset    Diabetes Mother     Dementia Mother     No Known Problems Sister     No Known Problems Brother       Social History:   Social History     Socioeconomic History    Marital status: Single   Tobacco Use    Smoking status: Never    Smokeless tobacco: Never   Vaping Use    Vaping status: Never Used   Substance and Sexual Activity    Alcohol use: Not Currently    Drug use: Never          REVIEW OF SYSTEMS:   GENERAL: feels well otherwise        EXAM:   /56   Pulse 64   Resp 18   Ht 5' 2\" (1.575 m)   Wt 132 lb (59.9 kg)   SpO2 98%   BMI 24.14 kg/m²     GENERAL: well developed, well nourished, in no apparent distress  L back-area was opened, packing was completely removed. There were still some white capsule areas remaining. Area was cleaned with iodine x 3. 5mL Xylocaine with epinephrine was injected. #7 scalpel and forceps were utilized to remove remaining portions of capsule. . Area was again packed with 1/4\" iodoform and dressed with tegaderm.       ASSESSMENT AND PLAN:     Infected sebaceous cyst  Keep covered; return in 72 hours for packing removal and dressing change.    Ayesha Fisher DO  12/26/2024  11:09 AM

## 2024-12-30 ENCOUNTER — OFFICE VISIT (OUTPATIENT)
Dept: INTERNAL MEDICINE CLINIC | Facility: CLINIC | Age: 84
End: 2024-12-30

## 2024-12-30 ENCOUNTER — TELEPHONE (OUTPATIENT)
Dept: INTERNAL MEDICINE CLINIC | Facility: CLINIC | Age: 84
End: 2024-12-30

## 2024-12-30 VITALS
DIASTOLIC BLOOD PRESSURE: 82 MMHG | HEART RATE: 61 BPM | WEIGHT: 133 LBS | OXYGEN SATURATION: 97 % | HEIGHT: 62 IN | BODY MASS INDEX: 24.48 KG/M2 | SYSTOLIC BLOOD PRESSURE: 132 MMHG

## 2024-12-30 DIAGNOSIS — L02.91 ABSCESS: Primary | ICD-10-CM

## 2024-12-30 PROCEDURE — 3079F DIAST BP 80-89 MM HG: CPT | Performed by: INTERNAL MEDICINE

## 2024-12-30 PROCEDURE — 3075F SYST BP GE 130 - 139MM HG: CPT | Performed by: INTERNAL MEDICINE

## 2024-12-30 PROCEDURE — 3008F BODY MASS INDEX DOCD: CPT | Performed by: INTERNAL MEDICINE

## 2024-12-30 NOTE — TELEPHONE ENCOUNTER
Spoke with Tammy. Potassium not prescribed by Dr. Fisher. Only OTC Potassium Gluconate on file as historical med. Tammy verbalized understanding.

## 2024-12-30 NOTE — PROGRESS NOTES
Nancy Velazquez is a 84 year old female.  Chief Complaint   Patient presents with    Checkup       HPI:   Nancy Velazquez is a 84 year old female who presents for: follow up abscess  Has been doing ok    Wt Readings from Last 6 Encounters:   12/30/24 133 lb (60.3 kg)   12/26/24 132 lb (59.9 kg)   12/23/24 132 lb 12.8 oz (60.2 kg)   08/30/24 125 lb (56.7 kg)   06/17/24 127 lb 3.2 oz (57.7 kg)   05/13/24 123 lb 4 oz (55.9 kg)     Body mass index is 24.33 kg/m².       Current Outpatient Medications   Medication Sig Dispense Refill    doxycycline 100 MG Oral Cap Take 1 capsule (100 mg total) by mouth 2 (two) times daily. 14 capsule 0    escitalopram 5 MG Oral Tab Take 1 tablet (5 mg total) by mouth daily. 90 tablet 3    donepezil 5 MG Oral Tab Take 1 tablet (5 mg total) by mouth nightly. 90 tablet 3    memantine 5 MG Oral Tab Take 1 tablet (5 mg total) by mouth 2 (two) times daily. 180 tablet 3    Cholecalciferol 125 MCG (5000 UT) Oral Tab Take 1 tablet (5,000 Units total) by mouth daily.      amLODIPine 5 MG Oral Tab Take 1 tablet (5 mg total) by mouth daily. 90 tablet 3    Red Yeast Rice 600 MG Oral Tab Take by mouth.      diphenhydrAMINE HCl, Sleep, (UNISOM SLEEPGELS) 50 MG Oral Cap Take by mouth nightly as needed.      Coenzyme Q10 (CO Q 10 OR) Take 1 tablet by mouth daily.      POTASSIUM GLUCONATE OR Take 1 tablet by mouth daily.      naproxen 220 MG Oral Tab Take 1 tablet (220 mg total) by mouth nightly as needed.      Calcium 600 MG Oral Tab Take 1 tablet daily 1 tablet 0    magnesium 250 MG Oral Tab Take 1 tablet (250 mg total) by mouth daily.        Past Medical History:    Essential hypertension    Fall    around 8/8/23    Lymph edema      No past surgical history on file.   Family History   Problem Relation Age of Onset    Diabetes Mother     Dementia Mother     No Known Problems Sister     No Known Problems Brother       Social History:   Social History     Socioeconomic History    Marital status: Single    Tobacco Use    Smoking status: Never    Smokeless tobacco: Never   Vaping Use    Vaping status: Never Used   Substance and Sexual Activity    Alcohol use: Not Currently    Drug use: Never          REVIEW OF SYSTEMS:   GENERAL: feels well otherwise  SKIN: abscess      EXAM:   /82   Pulse 61   Ht 5' 2\" (1.575 m)   Wt 133 lb (60.3 kg)   SpO2 97%   BMI 24.33 kg/m²     GENERAL: well developed, well nourished, in no apparent distress  L back abscess--dressing removed; packing removed. No residual infection noted. Healthy granulation tissue noted. Packing placed 1/4\". Covered with tegaderm.       ASSESSMENT AND PLAN:     Abscess  Continue doxycycline  Return in 2-3 days for dressing change    Ayesha Fisher DO  12/30/2024  2:00 PM

## 2024-12-31 ENCOUNTER — TELEPHONE (OUTPATIENT)
Dept: INTERNAL MEDICINE CLINIC | Facility: CLINIC | Age: 84
End: 2024-12-31

## 2024-12-31 RX ORDER — DOXYCYCLINE HYCLATE 100 MG
100 TABLET ORAL 2 TIMES DAILY
Qty: 10 TABLET | Refills: 0 | Status: SHIPPED
Start: 2024-12-31 | End: 2025-01-05

## 2025-01-03 ENCOUNTER — OFFICE VISIT (OUTPATIENT)
Dept: INTERNAL MEDICINE CLINIC | Facility: CLINIC | Age: 85
End: 2025-01-03

## 2025-01-03 VITALS
HEART RATE: 53 BPM | TEMPERATURE: 98 F | DIASTOLIC BLOOD PRESSURE: 56 MMHG | OXYGEN SATURATION: 96 % | SYSTOLIC BLOOD PRESSURE: 138 MMHG | BODY MASS INDEX: 23.92 KG/M2 | HEIGHT: 62 IN | RESPIRATION RATE: 16 BRPM | WEIGHT: 130 LBS

## 2025-01-03 DIAGNOSIS — L02.91 ABSCESS: Primary | ICD-10-CM

## 2025-01-03 PROCEDURE — 1160F RVW MEDS BY RX/DR IN RCRD: CPT | Performed by: INTERNAL MEDICINE

## 2025-01-03 PROCEDURE — 3078F DIAST BP <80 MM HG: CPT | Performed by: INTERNAL MEDICINE

## 2025-01-03 PROCEDURE — 1125F AMNT PAIN NOTED PAIN PRSNT: CPT | Performed by: INTERNAL MEDICINE

## 2025-01-03 PROCEDURE — 3075F SYST BP GE 130 - 139MM HG: CPT | Performed by: INTERNAL MEDICINE

## 2025-01-03 PROCEDURE — 3008F BODY MASS INDEX DOCD: CPT | Performed by: INTERNAL MEDICINE

## 2025-01-03 PROCEDURE — 99213 OFFICE O/P EST LOW 20 MIN: CPT | Performed by: INTERNAL MEDICINE

## 2025-01-03 PROCEDURE — 1159F MED LIST DOCD IN RCRD: CPT | Performed by: INTERNAL MEDICINE

## 2025-01-06 ENCOUNTER — OFFICE VISIT (OUTPATIENT)
Dept: INTERNAL MEDICINE CLINIC | Facility: CLINIC | Age: 85
End: 2025-01-06

## 2025-01-06 VITALS
DIASTOLIC BLOOD PRESSURE: 62 MMHG | HEIGHT: 62 IN | HEART RATE: 51 BPM | OXYGEN SATURATION: 98 % | BODY MASS INDEX: 23.92 KG/M2 | SYSTOLIC BLOOD PRESSURE: 134 MMHG | WEIGHT: 130 LBS | TEMPERATURE: 98 F

## 2025-01-06 DIAGNOSIS — L02.91 ABSCESS: Primary | ICD-10-CM

## 2025-01-06 PROCEDURE — 3078F DIAST BP <80 MM HG: CPT | Performed by: INTERNAL MEDICINE

## 2025-01-06 PROCEDURE — 3075F SYST BP GE 130 - 139MM HG: CPT | Performed by: INTERNAL MEDICINE

## 2025-01-06 PROCEDURE — 1126F AMNT PAIN NOTED NONE PRSNT: CPT | Performed by: INTERNAL MEDICINE

## 2025-01-06 PROCEDURE — 3008F BODY MASS INDEX DOCD: CPT | Performed by: INTERNAL MEDICINE

## 2025-01-06 NOTE — PROGRESS NOTES
Nancy Velazquez is a 84 year old female.  Chief Complaint   Patient presents with    Follow - Up     Wound check       HPI:   Nancy Velazquez is a 84 year old female who presents for:    Wt Readings from Last 6 Encounters:   25 130 lb (59 kg)   25 130 lb (59 kg)   24 133 lb (60.3 kg)   24 132 lb (59.9 kg)   24 132 lb 12.8 oz (60.2 kg)   24 125 lb (56.7 kg)     Body mass index is 23.78 kg/m².       Current Outpatient Medications   Medication Sig Dispense Refill    escitalopram 5 MG Oral Tab Take 1 tablet (5 mg total) by mouth daily. 90 tablet 3    donepezil 5 MG Oral Tab Take 1 tablet (5 mg total) by mouth nightly. 90 tablet 3    memantine 5 MG Oral Tab Take 1 tablet (5 mg total) by mouth 2 (two) times daily. 180 tablet 3    Cholecalciferol 125 MCG (5000 UT) Oral Tab Take 1 tablet (5,000 Units total) by mouth daily.      amLODIPine 5 MG Oral Tab Take 1 tablet (5 mg total) by mouth daily. 90 tablet 3    Red Yeast Rice 600 MG Oral Tab Take by mouth.      diphenhydrAMINE HCl, Sleep, (UNISOM SLEEPGELS) 50 MG Oral Cap Take by mouth nightly as needed.      Coenzyme Q10 (CO Q 10 OR) Take 1 tablet by mouth daily.      POTASSIUM GLUCONATE OR Take 1 tablet by mouth daily.      naproxen 220 MG Oral Tab Take 1 tablet (220 mg total) by mouth nightly as needed.      Calcium 600 MG Oral Tab Take 1 tablet daily 1 tablet 0    magnesium 250 MG Oral Tab Take 1 tablet (250 mg total) by mouth daily.      doxycycline 100 MG Oral Cap Take 1 capsule (100 mg total) by mouth 2 (two) times daily. (Patient not taking: Reported on 2025) 14 capsule 0      Past Medical History:    Anxiety    Depression    Essential hypertension    Fall    around 24    Hyperlipidemia    Lymph edema    Sleep apnea      Past Surgical History:   Procedure Laterality Date          Tubal ligation        Family History   Problem Relation Age of Onset    Diabetes Mother     Dementia Mother     No Known Problems Sister      No Known Problems Brother       Social History:   Social History     Socioeconomic History    Marital status: Single   Tobacco Use    Smoking status: Never     Passive exposure: Never    Smokeless tobacco: Never    Tobacco comments:     None   Vaping Use    Vaping status: Never Used   Substance and Sexual Activity    Alcohol use: Not Currently     Comment: Rarely    Drug use: Never          REVIEW OF SYSTEMS:   GENERAL: feels well otherwise  SKIN: denies any unusual skin lesions  HEENT: denies nasal congestion, sinus pain, ST, sore throat, ear pain  LUNGS: denies shortness of breath with exertion, wheezing, cough, or sputum production  CARDIOVASCULAR: denies chest pain, pressure, or palpitations      EXAM:   /62 (BP Location: Right arm, Patient Position: Sitting, Cuff Size: adult)   Pulse 51   Temp 98.3 °F (36.8 °C) (Oral)   Ht 5' 2\" (1.575 m)   Wt 130 lb (59 kg)   SpO2 98%   BMI 23.78 kg/m²     GENERAL: well developed, well nourished, in no apparent distress  HEENT: normal oropharynx without erythema or exudate, normal TM's, no sinus tenderness, nares patent  EYES: PERRLA, EOMI, conjunctivae are pink  NECK: supple, no carotic bruits, no thyromegaly, no cervical or supraclavicular LAD  LUNGS: clear to auscultation bilaterally, no wheezing or rhonchi  CARDIO: RRR, normal S1S2, no gallops or murmurs      ASSESSMENT AND PLAN:     There are no diagnoses linked to this encounter.    Advised to hydrate, rest. Try hot showers or steam to help ease congestion. Sleep with an extra pillow also to facilitate sinus drainage.     If you are not feeling better in a few days or if your symptoms worsen, please call the clinic or go to the emergency room for further treatment and care.     Ayesha Fisher DO  1/6/2025  1:06 PM

## 2025-01-07 NOTE — PROGRESS NOTES
Nancy Velazquez is a 84 year old female.  Chief Complaint   Patient presents with    Checkup     Wound check to abscess to mid back       HPI:   Nancy Velazquez is a 84 year old female who presents for: follow up wound check    Wt Readings from Last 6 Encounters:   25 130 lb (59 kg)   25 130 lb (59 kg)   24 133 lb (60.3 kg)   24 132 lb (59.9 kg)   24 132 lb 12.8 oz (60.2 kg)   24 125 lb (56.7 kg)     Body mass index is 23.78 kg/m².       Current Outpatient Medications   Medication Sig Dispense Refill    doxycycline 100 MG Oral Cap Take 1 capsule (100 mg total) by mouth 2 (two) times daily. (Patient not taking: Reported on 2025) 14 capsule 0    escitalopram 5 MG Oral Tab Take 1 tablet (5 mg total) by mouth daily. 90 tablet 3    donepezil 5 MG Oral Tab Take 1 tablet (5 mg total) by mouth nightly. 90 tablet 3    memantine 5 MG Oral Tab Take 1 tablet (5 mg total) by mouth 2 (two) times daily. 180 tablet 3    Cholecalciferol 125 MCG (5000 UT) Oral Tab Take 1 tablet (5,000 Units total) by mouth daily.      amLODIPine 5 MG Oral Tab Take 1 tablet (5 mg total) by mouth daily. 90 tablet 3    Red Yeast Rice 600 MG Oral Tab Take by mouth.      diphenhydrAMINE HCl, Sleep, (UNISOM SLEEPGELS) 50 MG Oral Cap Take by mouth nightly as needed.      Coenzyme Q10 (CO Q 10 OR) Take 1 tablet by mouth daily.      POTASSIUM GLUCONATE OR Take 1 tablet by mouth daily.      naproxen 220 MG Oral Tab Take 1 tablet (220 mg total) by mouth nightly as needed.      Calcium 600 MG Oral Tab Take 1 tablet daily 1 tablet 0    magnesium 250 MG Oral Tab Take 1 tablet (250 mg total) by mouth daily.        Past Medical History:    Anxiety    Depression    Essential hypertension    Fall    around 24    Hyperlipidemia    Lymph edema    Sleep apnea      Past Surgical History:   Procedure Laterality Date          Tubal ligation        Family History   Problem Relation Age of Onset    Diabetes Mother     Dementia  Mother     No Known Problems Sister     No Known Problems Brother       Social History:   Social History     Socioeconomic History    Marital status: Single   Tobacco Use    Smoking status: Never     Passive exposure: Never    Smokeless tobacco: Never    Tobacco comments:     None   Vaping Use    Vaping status: Never Used   Substance and Sexual Activity    Alcohol use: Not Currently     Comment: Rarely    Drug use: Never          REVIEW OF SYSTEMS:   GENERAL: feels well otherwise  SKIN: abscess      EXAM:   /56   Pulse 53   Temp 98.2 °F (36.8 °C) (Oral)   Resp 16   Ht 5' 2\" (1.575 m)   Wt 130 lb (59 kg)   SpO2 96%   BMI 23.78 kg/m²     GENERAL: well developed, well nourished, in no apparent distress  L back abscess-healing well with good granulation tissue      ASSESSMENT AND PLAN:     Abscess  Good granulation tissue without residual purulence.     Ayesha Fisher DO

## 2025-01-10 ENCOUNTER — OFFICE VISIT (OUTPATIENT)
Dept: INTERNAL MEDICINE CLINIC | Facility: CLINIC | Age: 85
End: 2025-01-10

## 2025-01-10 ENCOUNTER — MED REC SCAN ONLY (OUTPATIENT)
Dept: INTERNAL MEDICINE CLINIC | Facility: CLINIC | Age: 85
End: 2025-01-10

## 2025-01-10 ENCOUNTER — TELEPHONE (OUTPATIENT)
Dept: INTERNAL MEDICINE CLINIC | Facility: CLINIC | Age: 85
End: 2025-01-10

## 2025-01-10 VITALS
HEIGHT: 62 IN | OXYGEN SATURATION: 100 % | BODY MASS INDEX: 23.92 KG/M2 | WEIGHT: 130 LBS | TEMPERATURE: 98 F | DIASTOLIC BLOOD PRESSURE: 66 MMHG | HEART RATE: 52 BPM | SYSTOLIC BLOOD PRESSURE: 132 MMHG

## 2025-01-10 DIAGNOSIS — L02.91 ABSCESS: Primary | ICD-10-CM

## 2025-01-10 PROCEDURE — 3075F SYST BP GE 130 - 139MM HG: CPT | Performed by: INTERNAL MEDICINE

## 2025-01-10 PROCEDURE — 1126F AMNT PAIN NOTED NONE PRSNT: CPT | Performed by: INTERNAL MEDICINE

## 2025-01-10 PROCEDURE — 3008F BODY MASS INDEX DOCD: CPT | Performed by: INTERNAL MEDICINE

## 2025-01-10 PROCEDURE — 3078F DIAST BP <80 MM HG: CPT | Performed by: INTERNAL MEDICINE

## 2025-01-10 PROCEDURE — 1159F MED LIST DOCD IN RCRD: CPT | Performed by: INTERNAL MEDICINE

## 2025-01-10 NOTE — TELEPHONE ENCOUNTER
Called and spoke with Wendie at front office of [facility] T# [phone]. I read Dr Fisher's wound care instructions to her since Patients Nurse was not available. She voiced understanding of instructions and read instructions back to me correctly.    She will inform family patient to be seen back in office in 10-14 days.    Faxed 's wound care instruction letter to F# [phone] with fax confirmation received    Copy of letter to scan

## 2025-01-20 NOTE — PROGRESS NOTES
Nancy Velazquez is a 84 year old female.  Chief Complaint   Patient presents with    Wound Recheck     Wound recheck to patients back  Son here with patient       HPI:   Nancy Velazquez is a 84 year old female who presents for:    Wt Readings from Last 6 Encounters:   01/10/25 130 lb (59 kg)   25 130 lb (59 kg)   25 130 lb (59 kg)   24 133 lb (60.3 kg)   24 132 lb (59.9 kg)   24 132 lb 12.8 oz (60.2 kg)     Body mass index is 23.78 kg/m².       Current Outpatient Medications   Medication Sig Dispense Refill    escitalopram 5 MG Oral Tab Take 1 tablet (5 mg total) by mouth daily. 90 tablet 3    donepezil 5 MG Oral Tab Take 1 tablet (5 mg total) by mouth nightly. 90 tablet 3    memantine 5 MG Oral Tab Take 1 tablet (5 mg total) by mouth 2 (two) times daily. 180 tablet 3    Cholecalciferol 125 MCG (5000 UT) Oral Tab Take 1 tablet (5,000 Units total) by mouth daily.      amLODIPine 5 MG Oral Tab Take 1 tablet (5 mg total) by mouth daily. 90 tablet 3    Red Yeast Rice 600 MG Oral Tab Take by mouth.      Coenzyme Q10 (CO Q 10 OR) Take 1 tablet by mouth daily.      POTASSIUM GLUCONATE OR Take 1 tablet by mouth daily.      naproxen 220 MG Oral Tab Take 1 tablet (220 mg total) by mouth nightly as needed.      Calcium 600 MG Oral Tab Take 1 tablet daily 1 tablet 0    magnesium 250 MG Oral Tab Take 1 tablet (250 mg total) by mouth daily.      doxycycline 100 MG Oral Cap Take 1 capsule (100 mg total) by mouth 2 (two) times daily. (Patient not taking: Reported on 1/10/2025) 14 capsule 0    diphenhydrAMINE HCl, Sleep, (UNISOM SLEEPGELS) 50 MG Oral Cap Take by mouth nightly as needed. (Patient not taking: Reported on 1/10/2025)        Past Medical History:    Anxiety    Depression    Essential hypertension    Fall    around 23    Hyperlipidemia    Lymph edema    Sleep apnea      Past Surgical History:   Procedure Laterality Date          Tubal ligation        Family History   Problem  Relation Age of Onset    Diabetes Mother     Dementia Mother     No Known Problems Sister     No Known Problems Brother       Social History:   Social History     Socioeconomic History    Marital status: Single   Tobacco Use    Smoking status: Never     Passive exposure: Never    Smokeless tobacco: Never    Tobacco comments:     None   Vaping Use    Vaping status: Never Used   Substance and Sexual Activity    Alcohol use: Not Currently     Comment: Rarely    Drug use: Never          REVIEW OF SYSTEMS:   GENERAL: feels well otherwise  Skin: +wound      EXAM:   /66 (BP Location: Left arm, Patient Position: Sitting, Cuff Size: adult)   Pulse 52   Temp 98.1 °F (36.7 °C) (Oral)   Ht 5' 2\" (1.575 m)   Wt 130 lb (59 kg)   SpO2 100%   BMI 23.78 kg/m²     GENERAL: well developed, well nourished, in no apparent distress  L flank-wound with good granulation tissue; slight keloid formation; no drainage noted.     ASSESSMENT AND PLAN:     Abscess  Clinically improved; continue off antibiotic  No further packing needed; continue wound care  Follow up in 10-14 days    Ayesha Fisher DO

## 2025-01-24 RX ORDER — AMLODIPINE BESYLATE 5 MG/1
5 TABLET ORAL DAILY
Qty: 90 TABLET | Refills: 3 | Status: SHIPPED | OUTPATIENT
Start: 2025-01-24

## 2025-01-24 NOTE — TELEPHONE ENCOUNTER
Pt did not reach mychart     To Dr. LEWIS-    Pt has had multiple visits since Sept. Ok to continue rx? Pended

## 2025-06-20 ENCOUNTER — OFFICE VISIT (OUTPATIENT)
Dept: INTERNAL MEDICINE CLINIC | Facility: CLINIC | Age: 85
End: 2025-06-20

## 2025-06-20 VITALS
HEART RATE: 54 BPM | WEIGHT: 139.38 LBS | HEIGHT: 62 IN | SYSTOLIC BLOOD PRESSURE: 130 MMHG | DIASTOLIC BLOOD PRESSURE: 64 MMHG | BODY MASS INDEX: 25.65 KG/M2 | OXYGEN SATURATION: 100 % | TEMPERATURE: 98 F

## 2025-06-20 DIAGNOSIS — L72.3 SEBACEOUS CYST: Primary | ICD-10-CM

## 2025-06-20 PROCEDURE — 1159F MED LIST DOCD IN RCRD: CPT | Performed by: INTERNAL MEDICINE

## 2025-06-20 PROCEDURE — 3075F SYST BP GE 130 - 139MM HG: CPT | Performed by: INTERNAL MEDICINE

## 2025-06-20 PROCEDURE — 3008F BODY MASS INDEX DOCD: CPT | Performed by: INTERNAL MEDICINE

## 2025-06-20 PROCEDURE — 1126F AMNT PAIN NOTED NONE PRSNT: CPT | Performed by: INTERNAL MEDICINE

## 2025-06-20 PROCEDURE — 3078F DIAST BP <80 MM HG: CPT | Performed by: INTERNAL MEDICINE

## 2025-06-20 NOTE — PROGRESS NOTES
Nancy Velazquez is a 84 year old female.  Chief Complaint   Patient presents with    Urgent Care F/u       HPI:   Nancy Velazquez is a 84 year old female who presents for: urgent care follow up    Went to  for evaluation of chest cyst that was getting larger    Wt Readings from Last 6 Encounters:   06/20/25 139 lb 6.4 oz (63.2 kg)   01/10/25 130 lb (59 kg)   01/06/25 130 lb (59 kg)   01/03/25 130 lb (59 kg)   12/30/24 133 lb (60.3 kg)   12/26/24 132 lb (59.9 kg)     Body mass index is 25.5 kg/m².       Current Medications[1]   Past Medical History[2]   Past Surgical History[3]   Family History[4]   Social History:   Short Social Hx on File[5]       REVIEW OF SYSTEMS:   GENERAL: feels well otherwise  SKIN: cyst      EXAM:   /64   Pulse 54   Temp 98.4 °F (36.9 °C) (Oral)   Ht 5' 2\" (1.575 m)   Wt 139 lb 6.4 oz (63.2 kg)   SpO2 100%   BMI 25.50 kg/m²     GENERAL: well developed, well nourished, in no apparent distress  Chest skin: cyst size improved; no further drainage      ASSESSMENT AND PLAN:     Infected sebaceous cyst  Drained effectively; seems to be improving  Continue current wound care    Ayesha Fisher DO  6/20/2025  10:09 AM         [1]   Current Outpatient Medications   Medication Sig Dispense Refill    AMLODIPINE 5 MG Oral Tab TAKE 1 TABLET(5 MG) BY MOUTH DAILY 90 tablet 3    escitalopram 5 MG Oral Tab Take 1 tablet (5 mg total) by mouth daily. 90 tablet 3    donepezil 5 MG Oral Tab Take 1 tablet (5 mg total) by mouth nightly. 90 tablet 3    memantine 5 MG Oral Tab Take 1 tablet (5 mg total) by mouth 2 (two) times daily. 180 tablet 3    naproxen 220 MG Oral Tab Take 1 tablet (220 mg total) by mouth nightly as needed.      doxycycline 100 MG Oral Cap Take 1 capsule (100 mg total) by mouth 2 (two) times daily. (Patient not taking: Reported on 6/20/2025) 14 capsule 0    Cholecalciferol 125 MCG (5000 UT) Oral Tab Take 1 tablet (5,000 Units total) by mouth daily. (Patient not taking: Reported on  2025)      Red Yeast Rice 600 MG Oral Tab Take by mouth. (Patient not taking: Reported on 2025)      diphenhydrAMINE HCl, Sleep, (UNISOM SLEEPGELS) 50 MG Oral Cap Take by mouth nightly as needed. (Patient not taking: Reported on 2025)      Coenzyme Q10 (CO Q 10 OR) Take 1 tablet by mouth daily. (Patient not taking: Reported on 2025)      POTASSIUM GLUCONATE OR Take 1 tablet by mouth daily. (Patient not taking: Reported on 2025)      Calcium 600 MG Oral Tab Take 1 tablet daily (Patient not taking: Reported on 2025) 1 tablet 0    magnesium 250 MG Oral Tab Take 1 tablet (250 mg total) by mouth daily. (Patient not taking: Reported on 2025)     [2]   Past Medical History:   Anxiety    Depression    Essential hypertension    Fall    around 24    Hyperlipidemia    Lymph edema    Sleep apnea   [3]   Past Surgical History:  Procedure Laterality Date          Tubal ligation     [4]   Family History  Problem Relation Age of Onset    Diabetes Mother     Dementia Mother     No Known Problems Sister     No Known Problems Brother    [5]   Social History  Socioeconomic History    Marital status: Single   Tobacco Use    Smoking status: Never     Passive exposure: Never    Smokeless tobacco: Never    Tobacco comments:     None   Vaping Use    Vaping status: Never Used   Substance and Sexual Activity    Alcohol use: Not Currently     Comment: Rarely    Drug use: Never

## (undated) DIAGNOSIS — I89.0 LYMPHEDEMA: Primary | ICD-10-CM

## (undated) DIAGNOSIS — M54.50 CHRONIC MIDLINE LOW BACK PAIN WITHOUT SCIATICA: Primary | ICD-10-CM

## (undated) DIAGNOSIS — G89.29 CHRONIC MIDLINE LOW BACK PAIN WITHOUT SCIATICA: Primary | ICD-10-CM

## (undated) NOTE — LETTER
8/8/2022          To Whom It May Concern:    Patrick River is currently under my medical care and it is with her permission that I provide this correspondence. She is experiencing worsening memory over time and may have some functional limitations that impair some of her daily activities. I believe she would benefit from community access services to assist her with basic activities of daily living including assistance with meals, activities of self-care, and activities that can assist in her quality of life. She would benefit from 35 hours of assistance during the week as she is living with family who work full-time. If you require additional information please contact our office.         Sincerely,          Raúl Hung MD          Document generated by:  Raúl Hung MD

## (undated) NOTE — LETTER
Nancy Velazquez        [id number] [facility] [address]         Dear Nancy,    This is the [facility], office of Ayesha Fisher DO    Thank you for putting your trust in [facility].  Our goal is to deliver the highest quality healthcare and an exceptional patient experience. Upon reviewing of your medical record shows you are due for the following:     Annual Physical     Please call [phone] to schedule your appointment or schedule online via [employer].     If you changed to a new provider at another facility, please notify the clinic to update your records.    If you had any recent testing at another facility, please have your results faxed to our office at [phone].     Thank you and have a great day!    Sincerely,    Ayesha Fisher DO          Document electronically generated by:  Sandrine Munoz

## (undated) NOTE — LETTER
10/25/2024              Nancy Velazquez        [id number] [facility] [address]         Dear Nancy,    ***    Sincerely,    Ayesha Fisher, DO          Document electronically generated by:  Sandrine Munoz

## (undated) NOTE — LETTER
[facility], [address] [facility] [place]  [address] [place] [id number]  [phone]     9/20/2023        Hello,     This is the [facility], office of Garrison Marino DO    Thank you for putting your trust in [facility]. Our goal is to deliver the highest quality healthcare and an exceptional patient experience. Upon reviewing of your medical record shows you are due for the following:       Annual Physical   Annual Medicare Physical         Please call [phone] to schedule your appointment or schedule online via [employer]. If you changed to a new provider at another facility, please notify the clinic to update your records. If you had any recent testing at another facility, please have your results faxed to our office at [phone]. Thank you and have a great day!

## (undated) NOTE — LETTER
1/10/2025              Nancy Velazquez        [id number] [facility] [address]         To whom it may concern,    Nancy Velazquez was seen in the office today. Her wound is healing very nicely. For the next 2 weeks, I would like daily dressing changes with petroleum gauze. Keep covered with tegaderm with showers. Please contact me if there are any concerns for recurrent infection.   I would like to see her back for a wound check in 10-14 days.     Sincerely,    Ayesha Fisher DO          Document electronically generated by:  Ayesha Fisher DO

## (undated) NOTE — LETTER
Nadja Marks, [address] [facility]   [facility],  [facility]       10/28/21        Patient: Erica Mcconnell   YOB: 1940   Date of Visit: 10/28/2021       Dear  Dr. Aminta Pink MD,      Thank you for referring Erica Mcconnell to my practice.